# Patient Record
Sex: MALE | Race: WHITE | Employment: FULL TIME | URBAN - METROPOLITAN AREA
[De-identification: names, ages, dates, MRNs, and addresses within clinical notes are randomized per-mention and may not be internally consistent; named-entity substitution may affect disease eponyms.]

---

## 2017-01-19 ENCOUNTER — MYC REFILL (OUTPATIENT)
Dept: FAMILY MEDICINE | Facility: CLINIC | Age: 22
End: 2017-01-19

## 2017-01-19 DIAGNOSIS — F42.9 OCD (OBSESSIVE COMPULSIVE DISORDER): ICD-10-CM

## 2017-01-19 DIAGNOSIS — F33.0 MAJOR DEPRESSIVE DISORDER, RECURRENT EPISODE, MILD (H): ICD-10-CM

## 2017-01-20 NOTE — TELEPHONE ENCOUNTER
Message from [employer]:  Original authorizing provider: FABIENNE VENTURA MD    Rickey Villalta would like a refill of the following medications:  citalopram (CELEXA) 20 MG tablet [FABIENNE VENTURA MD]  buPROPion (WELLBUTRIN XL) 300 MG 24 hr tablet [FABIENNE VENTURA MD]    Preferred pharmacy: [place] [address] [address] [facility], [address]    Comment:

## 2017-01-25 RX ORDER — CITALOPRAM HYDROBROMIDE 20 MG/1
20 TABLET ORAL DAILY
Qty: 90 TABLET | Refills: 1 | Status: SHIPPED | OUTPATIENT
Start: 2017-01-25 | End: 2017-05-10

## 2017-01-25 RX ORDER — BUPROPION HYDROCHLORIDE 300 MG/1
300 TABLET ORAL EVERY MORNING
Qty: 90 TABLET | Refills: 1 | Status: SHIPPED | OUTPATIENT
Start: 2017-01-25 | End: 2017-05-10

## 2017-01-25 ASSESSMENT — PATIENT HEALTH QUESTIONNAIRE - PHQ9: SUM OF ALL RESPONSES TO PHQ QUESTIONS 1-9: 0

## 2017-01-26 ASSESSMENT — PATIENT HEALTH QUESTIONNAIRE - PHQ9: SUM OF ALL RESPONSES TO PHQ QUESTIONS 1-9: 0

## 2017-02-28 ENCOUNTER — MYC REFILL (OUTPATIENT)
Dept: FAMILY MEDICINE | Facility: CLINIC | Age: 22
End: 2017-02-28

## 2017-02-28 DIAGNOSIS — F42.9 OCD (OBSESSIVE COMPULSIVE DISORDER): ICD-10-CM

## 2017-02-28 DIAGNOSIS — F33.0 MAJOR DEPRESSIVE DISORDER, RECURRENT EPISODE, MILD (H): ICD-10-CM

## 2017-02-28 RX ORDER — CITALOPRAM HYDROBROMIDE 20 MG/1
20 TABLET ORAL DAILY
Qty: 90 TABLET | Refills: 1 | Status: CANCELLED | OUTPATIENT
Start: 2017-02-28

## 2017-02-28 RX ORDER — BUPROPION HYDROCHLORIDE 300 MG/1
300 TABLET ORAL EVERY MORNING
Qty: 90 TABLET | Refills: 1 | Status: CANCELLED | OUTPATIENT
Start: 2017-02-28

## 2017-02-28 NOTE — TELEPHONE ENCOUNTER
Message from [employer]:  Original authorizing provider: FABIENNE VENTURA MD    Rickey Villalta would like a refill of the following medications:  citalopram (CELEXA) 20 MG tablet [FABIENNE VENTURA MD]  buPROPion (WELLBUTRIN XL) 300 MG 24 hr tablet [FABIENNE VENTURA MD]    Preferred pharmacy: [place] [address] [address] [facility], [address]    Comment:

## 2017-05-10 ENCOUNTER — OFFICE VISIT (OUTPATIENT)
Dept: FAMILY MEDICINE | Facility: CLINIC | Age: 22
End: 2017-05-10
Payer: COMMERCIAL

## 2017-05-10 VITALS
SYSTOLIC BLOOD PRESSURE: 119 MMHG | OXYGEN SATURATION: 100 % | DIASTOLIC BLOOD PRESSURE: 65 MMHG | BODY MASS INDEX: 21.33 KG/M2 | HEART RATE: 55 BPM | HEIGHT: 69 IN | WEIGHT: 144 LBS

## 2017-05-10 DIAGNOSIS — F33.0 MAJOR DEPRESSIVE DISORDER, RECURRENT EPISODE, MILD (H): ICD-10-CM

## 2017-05-10 DIAGNOSIS — Z00.00 ROUTINE GENERAL MEDICAL EXAMINATION AT A HEALTH CARE FACILITY: Primary | ICD-10-CM

## 2017-05-10 DIAGNOSIS — J30.2 SEASONAL ALLERGIC RHINITIS, UNSPECIFIED ALLERGIC RHINITIS TRIGGER: ICD-10-CM

## 2017-05-10 DIAGNOSIS — Z23 NEED FOR VACCINATION: ICD-10-CM

## 2017-05-10 DIAGNOSIS — Z01.89 DIAGNOSTIC SKIN AND SENSITIZATION TESTS: ICD-10-CM

## 2017-05-10 DIAGNOSIS — F42.9 OCD (OBSESSIVE COMPULSIVE DISORDER): ICD-10-CM

## 2017-05-10 DIAGNOSIS — Z82.49 FHX: CARDIOVASCULAR DISEASE: ICD-10-CM

## 2017-05-10 DIAGNOSIS — Z11.3 SCREEN FOR STD (SEXUALLY TRANSMITTED DISEASE): ICD-10-CM

## 2017-05-10 LAB
ANION GAP SERPL CALCULATED.3IONS-SCNC: 9 MMOL/L (ref 3–14)
BUN SERPL-MCNC: 24 MG/DL (ref 7–30)
CALCIUM SERPL-MCNC: 9.6 MG/DL (ref 8.5–10.1)
CHLORIDE SERPL-SCNC: 105 MMOL/L (ref 94–109)
CHOLEST SERPL-MCNC: 123 MG/DL
CO2 SERPL-SCNC: 28 MMOL/L (ref 20–32)
CREAT SERPL-MCNC: 1.28 MG/DL (ref 0.66–1.25)
GFR SERPL CREATININE-BSD FRML MDRD: 69 ML/MIN/1.7M2
GLUCOSE SERPL-MCNC: 87 MG/DL (ref 70–99)
HDLC SERPL-MCNC: 53 MG/DL
LDLC SERPL CALC-MCNC: 60 MG/DL
NONHDLC SERPL-MCNC: 70 MG/DL
POTASSIUM SERPL-SCNC: 4.5 MMOL/L (ref 3.4–5.3)
SODIUM SERPL-SCNC: 142 MMOL/L (ref 133–144)
TRIGL SERPL-MCNC: 48 MG/DL

## 2017-05-10 PROCEDURE — 99395 PREV VISIT EST AGE 18-39: CPT | Mod: 25 | Performed by: PHYSICIAN ASSISTANT

## 2017-05-10 PROCEDURE — 90734 MENACWYD/MENACWYCRM VACC IM: CPT | Performed by: PHYSICIAN ASSISTANT

## 2017-05-10 PROCEDURE — 80061 LIPID PANEL: CPT | Performed by: PHYSICIAN ASSISTANT

## 2017-05-10 PROCEDURE — 90651 9VHPV VACCINE 2/3 DOSE IM: CPT | Performed by: PHYSICIAN ASSISTANT

## 2017-05-10 PROCEDURE — 36415 COLL VENOUS BLD VENIPUNCTURE: CPT | Performed by: PHYSICIAN ASSISTANT

## 2017-05-10 PROCEDURE — 80048 BASIC METABOLIC PNL TOTAL CA: CPT | Performed by: PHYSICIAN ASSISTANT

## 2017-05-10 PROCEDURE — 90471 IMMUNIZATION ADMIN: CPT | Performed by: PHYSICIAN ASSISTANT

## 2017-05-10 PROCEDURE — 90472 IMMUNIZATION ADMIN EACH ADD: CPT | Performed by: PHYSICIAN ASSISTANT

## 2017-05-10 RX ORDER — BUPROPION HYDROCHLORIDE 300 MG/1
300 TABLET ORAL EVERY MORNING
Qty: 30 TABLET | Refills: 0 | Status: SHIPPED | OUTPATIENT
Start: 2017-05-10 | End: 2017-05-10

## 2017-05-10 RX ORDER — CITALOPRAM HYDROBROMIDE 20 MG/1
20 TABLET ORAL DAILY
Qty: 30 TABLET | Refills: 0 | Status: SHIPPED | OUTPATIENT
Start: 2017-05-10 | End: 2017-05-10

## 2017-05-10 RX ORDER — CETIRIZINE HYDROCHLORIDE 10 MG/1
10 TABLET ORAL EVERY EVENING
Qty: 30 TABLET | Refills: 11 | Status: SHIPPED | OUTPATIENT
Start: 2017-05-10 | End: 2019-03-15

## 2017-05-10 RX ORDER — BUPROPION HYDROCHLORIDE 300 MG/1
300 TABLET ORAL EVERY MORNING
Qty: 90 TABLET | Refills: 1 | Status: SHIPPED | OUTPATIENT
Start: 2017-05-10 | End: 2018-01-09

## 2017-05-10 RX ORDER — CITALOPRAM HYDROBROMIDE 20 MG/1
20 TABLET ORAL DAILY
Qty: 90 TABLET | Refills: 1 | Status: SHIPPED | OUTPATIENT
Start: 2017-05-10 | End: 2018-01-09

## 2017-05-10 ASSESSMENT — ANXIETY QUESTIONNAIRES
2. NOT BEING ABLE TO STOP OR CONTROL WORRYING: NOT AT ALL
6. BECOMING EASILY ANNOYED OR IRRITABLE: NOT AT ALL
1. FEELING NERVOUS, ANXIOUS, OR ON EDGE: NOT AT ALL
GAD7 TOTAL SCORE: 0
3. WORRYING TOO MUCH ABOUT DIFFERENT THINGS: NOT AT ALL
IF YOU CHECKED OFF ANY PROBLEMS ON THIS QUESTIONNAIRE, HOW DIFFICULT HAVE THESE PROBLEMS MADE IT FOR YOU TO DO YOUR WORK, TAKE CARE OF THINGS AT HOME, OR GET ALONG WITH OTHER PEOPLE: NOT DIFFICULT AT ALL
7. FEELING AFRAID AS IF SOMETHING AWFUL MIGHT HAPPEN: NOT AT ALL
5. BEING SO RESTLESS THAT IT IS HARD TO SIT STILL: NOT AT ALL

## 2017-05-10 ASSESSMENT — PATIENT HEALTH QUESTIONNAIRE - PHQ9: 5. POOR APPETITE OR OVEREATING: NOT AT ALL

## 2017-05-10 NOTE — MR AVS SNAPSHOT
After Visit Summary   5/10/2017    Rickey Villalta    MRN: [id number]           Patient Information     Date Of Birth          1995        Visit Information        Provider Department      5/10/2017 7:40 AM Mik Eng PA-C [facility]        Today's Diagnoses     Routine general medical examination at a health care facility    -  1    OCD (obsessive compulsive disorder)        Major depressive disorder, recurrent episode, mild (H)        Seasonal allergic rhinitis, unspecified allergic rhinitis trigger        Screen for STD (sexually transmitted disease)          Care Instructions      Preventive Health Recommendations  Male Ages 18 - 25     Yearly exam:             See your health care provider every year in order to  o   Review health changes.   o   Discuss preventive care.    o   Review your medicines if your doctor has prescribed any.    You should be tested each year for STDs (sexually transmitted diseases).     Talk to your provider about cholesterol testing.      If you are at risk for diabetes, you should have a diabetes test (fasting glucose).    Shots: Get a flu shot each year. Get a tetanus shot every 10 years.     Nutrition:    Eat at least 5 servings of fruits and vegetables daily.     Eat whole-grain bread, whole-wheat pasta and brown rice instead of white grains and rice.     Talk to your provider about calcium and Vitamin D.     Lifestyle    Exercise for at least 150 minutes a week (30 minutes a day, 5 days a week). This will help you control your weight and prevent disease.     Limit alcohol to one drink per day.     No smoking.     Wear sunscreen to prevent skin cancer.     See your dentist every six months for an exam and cleaning.           Follow-ups after your visit        Who to contact     If you have questions or need follow up information about today's clinic visit or your schedule please contact [facility] directly at  "[phone].  Normal or non-critical lab and imaging results will be communicated to you by ScheduleThinghart, letter or phone within 4 business days after the clinic has received the results. If you do not hear from us within 7 days, please contact the clinic through G-CONt or phone. If you have a critical or abnormal lab result, we will notify you by phone as soon as possible.  Submit refill requests through [employer] or call your pharmacy and they will forward the refill request to us. Please allow 3 business days for your refill to be completed.          Additional Information About Your Visit        ScheduleThinghart Information     [employer] gives you secure access to your electronic health record. If you see a primary care provider, you can also send messages to your care team and make appointments. If you have questions, please call your primary care clinic.  If you do not have a primary care provider, please call [phone] and they will assist you.        Care EveryWhere ID     This is your Care EveryWhere ID. This could be used by other organizations to access your [facility] [place] medical records  [id number]        Your Vitals Were     Pulse Height Pulse Oximetry BMI (Body Mass Index)          55 5' 8.5\" (1.74 m) 100% 21.58 kg/m2         Blood Pressure from Last 3 Encounters:   05/10/17 119/65   07/07/16 113/65   06/22/16 117/66    Weight from Last 3 Encounters:   05/10/17 144 lb (65.3 kg)   07/07/16 144 lb (65.3 kg)   06/22/16 143 lb (64.9 kg)              We Performed the Following     Basic metabolic panel     Chlamydia trachomatis PCR     DEPRESSION ACTION PLAN (DAP)     Lipid panel reflex to direct LDL          Today's Medication Changes          These changes are accurate as of: 5/10/17  8:08 AM.  If you have any questions, ask your nurse or doctor.               Start taking these medicines.        Dose/Directions    buPROPion 300 MG 24 hr tablet   Commonly known as:  WELLBUTRIN XL   Used for:  OCD (obsessive compulsive " disorder), Major depressive disorder, recurrent episode, mild (H)   Started by:  Mik Eng PA-C        Dose:  300 mg   Take 1 tablet (300 mg) by mouth every morning   Quantity:  90 tablet   Refills:  1       cetirizine 10 MG tablet   Commonly known as:  zyrTEC   Used for:  Seasonal allergic rhinitis, unspecified allergic rhinitis trigger   Started by:  Mik Eng PA-C        Dose:  10 mg   Take 1 tablet (10 mg) by mouth every evening   Quantity:  30 tablet   Refills:  11       citalopram 20 MG tablet   Commonly known as:  celeXA   Used for:  OCD (obsessive compulsive disorder), Major depressive disorder, recurrent episode, mild (H)   Started by:  Mik Eng PA-C        Dose:  20 mg   Take 1 tablet (20 mg) by mouth daily   Quantity:  90 tablet   Refills:  1            Where to get your medicines      These medications were sent to [address], [facility] [address]  [address] [place] [address], [facility] [address]     Phone:  [phone]     cetirizine 10 MG tablet         Some of these will need a paper prescription and others can be bought over the counter.  Ask your nurse if you have questions.     Bring a paper prescription for each of these medications     buPROPion 300 MG 24 hr tablet    citalopram 20 MG tablet                Primary Care Provider Office Phone # Fax #    Mik Eng PA-C [phone]       [address] [address]        Thank you!     Thank you for choosing [facility]  for your care. Our goal is always to provide you with excellent care. Hearing back from our patients is one way we can continue to improve our services. Please take a few minutes to complete the written survey that you may receive in the mail after your visit with us. Thank you!             Your Updated Medication List - Protect others around you: Learn how to safely use, store and throw  away your medicines at www.disposemymeds.org.          This list is accurate as of: 5/10/17  8:08 AM.  Always use your most recent med list.                   Brand Name Dispense Instructions for use    buPROPion 300 MG 24 hr tablet    WELLBUTRIN XL    90 tablet    Take 1 tablet (300 mg) by mouth every morning       cetirizine 10 MG tablet    zyrTEC    30 tablet    Take 1 tablet (10 mg) by mouth every evening       citalopram 20 MG tablet    celeXA    90 tablet    Take 1 tablet (20 mg) by mouth daily

## 2017-05-10 NOTE — NURSING NOTE
Screening Questionnaire for Adult Immunization    Are you sick today?   No   Do you have allergies to medications, food, a vaccine component or latex?   No   Have you ever had a serious reaction after receiving a vaccination?   No   Do you have a long-term health problem with heart disease, lung disease, asthma, kidney disease, metabolic disease (e.g. diabetes), anemia, or other blood disorder?   No   Do you have cancer, leukemia, HIV/AIDS, or any other immune system problem?   No   In the past 3 months, have you taken medications that affect  your immune system, such as prednisone, other steroids, or anticancer drugs; drugs for the treatment of rheumatoid arthritis, Crohn s disease, or psoriasis; or have you had radiation treatments?   No   Have you had a seizure, or a brain or other nervous system problem?   No   During the past year, have you received a transfusion of blood or blood     products, or been given immune (gamma) globulin or antiviral drug?   No   For women: Are you pregnant or is there a chance you could become        pregnant during the next month?   No   Have you received any vaccinations in the past 4 weeks?   No     Immunization questionnaire answers were all negative.      MNVFC doesn't apply on this patient    Per orders of ADO, injection of HPV, Menectra given by Seema Skelton. Patient instructed to remain in clinic for 20 minutes afterwards, and to report any adverse reaction to me immediately.       Screening performed by Seema Skelton on 5/10/2017 at 8:15 AM.

## 2017-05-10 NOTE — NURSING NOTE
"Chief Complaint   Patient presents with     Physical       Initial /65  Pulse 55  Ht 5' 8.5\" (1.74 m)  Wt 144 lb (65.3 kg)  SpO2 100%  BMI 21.58 kg/m2 Estimated body mass index is 21.58 kg/(m^2) as calculated from the following:    Height as of this encounter: 5' 8.5\" (1.74 m).    Weight as of this encounter: 144 lb (65.3 kg).  Medication Reconciliation: complete  Seema Godfrey CMA    "

## 2017-05-10 NOTE — PROGRESS NOTES
SUBJECTIVE:     CC: Rickey Villalta is an 21 year old male who presents for preventative health visit.     Physical   Annual:     Getting at least 3 servings of Calcium per day::  Yes    Bi-annual eye exam::  Yes    Dental care twice a year::  Yes    Sleep apnea or symptoms of sleep apnea::  None    Diet::  Regular (no restrictions)    Frequency of exercise::  2-3 days/week    Duration of exercise::  45-60 minutes    Taking medications regularly::  Yes    Medication side effects::  None    Additional concerns today::  No      No concerns. Needs refills on meds  Depression and OCD stable on meds.       Today's PHQ-2 Score:   PHQ-2 ( 1999 Pfizer) 5/7/2017   Little interest or pleasure in doing things Not at all   Feeling down, depressed or hopeless Not at all   PHQ-2 Score 0       Abuse: Current or Past(Physical, Sexual or Emotional)- No  Do you feel safe in your environment - Yes    Social History   Substance Use Topics     Smoking status: Never Smoker     Smokeless tobacco: Never Used     Alcohol use Yes      Comment: Occ     The patient does not drink >3 drinks per day nor >7 drinks per week.    Last PSA: No results found for: PSA    Recent Labs   Lab Test  05/05/14   1526   CHOL  132   HDL  40*   LDL  76   TRIG  81   CHOLHDLRATIO  3.3       Reviewed orders with patient. Reviewed health maintenance and updated orders accordingly - Yes    Reviewed and updated as needed this visit by clinical staff  Tobacco  Allergies  Meds  Problems  Med Hx  Surg Hx  Fam Hx  Soc Hx          Reviewed and updated as needed this visit by Provider  Tobacco  Allergies  Meds  Problems  Med Hx  Surg Hx  Fam Hx  Soc Hx           Past Medical History:   Diagnosis Date     Coronary artery disease 2011    Heart murmur noted     Diagnostic skin and sensitization tests 5/12/14 IgE tests pos. only for: tree pollens.      Mild major depression (H) 5/1/2012     OCD (obsessive compulsive disorder)      Oral allergy syndrome     raw  carrots, celery, apples, peach--NOT a true food allergy and NO Epipen needed.     Seasonal allergic conjunctivitis     5/12/14 IgE tests pos. only for: tree pollens.      Seasonal allergic rhinitis       Past Surgical History:   Procedure Laterality Date     C STOMACH SURGERY PROCEDURE UNLISTED       EYE SURGERY  2014    Lasik     HC OPEN TX RADIAL & ULNAR SHAFT FX FIX RADIUS & ULNA       HC PERC SKELETAL FIX UNSTABLE PHAL SHAFT FX W MANIP, EACH  10/19/2010    perc pinning Rt index prox phalanx fx     HERNIA REPAIR  1996       ROS:  C: NEGATIVE for fever, chills, change in weight  I: NEGATIVE for worrisome rashes, moles or lesions  E: NEGATIVE for vision changes or irritation  ENT: NEGATIVE for ear, mouth and throat problems  R: NEGATIVE for significant cough or SOB  CV: NEGATIVE for chest pain, palpitations or peripheral edema  GI: NEGATIVE for nausea, abdominal pain, heartburn, or change in bowel habits   male: negative for dysuria, hematuria, decreased urinary stream, erectile dysfunction, urethral discharge  M: NEGATIVE for significant arthralgias or myalgia  N: NEGATIVE for weakness, dizziness or paresthesias  P: NEGATIVE for changes in mood or affect    Problem list, Medication list, Allergies, and Medical/Social/Surgical histories reviewed in [facility] and updated as appropriate.  Labs reviewed in EPIC  BP Readings from Last 3 Encounters:   05/10/17 119/65   07/07/16 113/65   06/22/16 117/66    Wt Readings from Last 3 Encounters:   05/10/17 144 lb (65.3 kg)   07/07/16 144 lb (65.3 kg)   06/22/16 143 lb (64.9 kg)                  Patient Active Problem List   Diagnosis     OCD (obsessive compulsive disorder)     Heart murmur     Infected insect bite of right leg     Impacted cerumen     Seasonal allergic conjunctivitis     Seasonal allergic rhinitis     Diagnostic skin and sensitization tests     FHx: cardiovascular disease     Common wart     Major depressive disorder, recurrent episode, mild (H)     Past  "Surgical History:   Procedure Laterality Date     C STOMACH SURGERY PROCEDURE UNLISTED       EYE SURGERY      Lasik     HC OPEN TX RADIAL & ULNAR SHAFT FX FIX RADIUS & ULNA       HC PERC SKELETAL FIX UNSTABLE PHAL SHAFT FX W MANIP, EACH  10/19/2010    perc pinning Rt index prox phalanx fx     HERNIA REPAIR         Social History   Substance Use Topics     Smoking status: Never Smoker     Smokeless tobacco: Never Used     Alcohol use Yes      Comment: Occ     Family History   Problem Relation Age of Onset     DIABETES Paternal Grandfather      Coronary Artery Disease Father      Hypertension Father      Myocardial Infarction Father 47          Macular Degeneration No family hx of      Glaucoma No family hx of      Thyroid Disease No family hx of      CANCER No family hx of      CEREBROVASCULAR DISEASE No family hx of          Current Outpatient Prescriptions   Medication Sig Dispense Refill     cetirizine (ZYRTEC) 10 MG tablet Take 1 tablet (10 mg) by mouth every evening 30 tablet 11     citalopram (CELEXA) 20 MG tablet Take 1 tablet (20 mg) by mouth daily 90 tablet 1     buPROPion (WELLBUTRIN XL) 300 MG 24 hr tablet Take 1 tablet (300 mg) by mouth every morning 90 tablet 1     [DISCONTINUED] citalopram (CELEXA) 20 MG tablet Take 1 tablet (20 mg) by mouth daily 30 tablet 0     [DISCONTINUED] buPROPion (WELLBUTRIN XL) 300 MG 24 hr tablet Take 1 tablet (300 mg) by mouth every morning 30 tablet 0     [DISCONTINUED] citalopram (CELEXA) 20 MG tablet Take 1 tablet (20 mg) by mouth daily 90 tablet 1     [DISCONTINUED] buPROPion (WELLBUTRIN XL) 300 MG 24 hr tablet Take 1 tablet (300 mg) by mouth every morning 90 tablet 1     Allergies   Allergen Reactions     Nkda [No Known Drug Allergies]      OBJECTIVE:     /65  Pulse 55  Ht 5' 8.5\" (1.74 m)  Wt 144 lb (65.3 kg)  SpO2 100%  BMI 21.58 kg/m2  EXAM:  GENERAL: healthy, alert and no distress  EYES: Eyes grossly normal to inspection, PERRL and conjunctivae " and sclerae normal  HENT: ear canals and TM's normal, nose and mouth without ulcers or lesions  NECK: no adenopathy, no asymmetry, masses, or scars and thyroid normal to palpation  RESP: lungs clear to auscultation - no rales, rhonchi or wheezes  CV: regular rate and rhythm, normal S1 S2, no S3 or S4, no murmur, click or rub, no peripheral edema and peripheral pulses strong  ABDOMEN: soft, nontender, no hepatosplenomegaly, no masses and bowel sounds normal   (male): normal male genitalia without lesions or urethral discharge, no hernia  MS: no gross musculoskeletal defects noted, no edema  SKIN: no suspicious lesions or rashes  NEURO: Normal strength and tone, mentation intact and speech normal  PSYCH: mentation appears normal, affect normal/bright    ASSESSMENT/PLAN:         ICD-10-CM    1. Routine general medical examination at a health care facility Z00.00 Lipid panel reflex to direct LDL     Basic metabolic panel   2. OCD (obsessive compulsive disorder) F42.9 citalopram (CELEXA) 20 MG tablet     buPROPion (WELLBUTRIN XL) 300 MG 24 hr tablet     DISCONTINUED: citalopram (CELEXA) 20 MG tablet     DISCONTINUED: buPROPion (WELLBUTRIN XL) 300 MG 24 hr tablet   3. Major depressive disorder, recurrent episode, mild (H) F33.0 citalopram (CELEXA) 20 MG tablet     buPROPion (WELLBUTRIN XL) 300 MG 24 hr tablet     DISCONTINUED: citalopram (CELEXA) 20 MG tablet     DISCONTINUED: buPROPion (WELLBUTRIN XL) 300 MG 24 hr tablet   4. Seasonal allergic rhinitis, unspecified allergic rhinitis trigger J30.2 cetirizine (ZYRTEC) 10 MG tablet   5. Screen for STD (sexually transmitted disease) Z11.3 Chlamydia trachomatis PCR   6. Need for vaccination Z23 MENINGOCOCCAL VACCINE,IM (MENACTRA) [01942] AGE 11-55     HUMAN PAPILLOMA VIRUS (GARDASIL 9) VACCINE [74954]     1st  Administration  [57919]     Each additional admin.  (Right click and add QUANTITY)  [04463]   7. FHx: cardiovascular disease Z82.49    8. Diagnostic skin and  "sensitization tests Z01.89        COUNSELING:   Reviewed preventive health counseling, as reflected in patient instructions       Regular exercise       Healthy diet/nutrition         reports that he has never smoked. He has never used smokeless tobacco.    Estimated body mass index is 21.58 kg/(m^2) as calculated from the following:    Height as of this encounter: 5' 8.5\" (1.74 m).    Weight as of this encounter: 144 lb (65.3 kg).       Counseling Resources:  ATP IV Guidelines  Pooled Cohorts Equation Calculator  FRAX Risk Assessment  ICSI Preventive Guidelines  Dietary Guidelines for Americans, 2010  USDA's MyPlate  ASA Prophylaxis  Lung CA Screening    Mik Eng PA-C  [facility]  "

## 2017-05-10 NOTE — LETTER
My Depression Action Plan  Name: Rickey Villalta   Date of Birth 1995  Date: 5/10/2017    My doctor: Johnny Yan   My clinic: [facility]  [address] [place] [id number] [phone]          GREEN    ZONE   Good Control    What it looks like:     Things are going generally well. You have normal up s and down s. You may even feel depressed from time to time, but bad moods usually last less than a day.   What you need to do:  1. Continue to care for yourself (see self care plan)  2. Check your depression survival kit and update it as needed  3. Follow your physician s recommendations including any medication.  4. Do not stop taking medication unless you consult with your physician first.           YELLOW         ZONE Getting Worse    What it looks like:     Depression is starting to interfere with your life.     It may be hard to get out of bed; you may be starting to isolate yourself from others.    Symptoms of depression are starting to last most all day and this has happened for several days.     You may have suicidal thoughts but they are not constant.   What you need to do:     1. Call your care team, your response to treatment will improve if you keep your care team informed of your progress. Yellow periods are signs an adjustment may need to be made.     2. Continue your self-care, even if you have to fake it!    3. Talk to someone in your support network    4. Open up your depression survival kit           RED    ZONE Medical Alert - Get Help    What it looks like:     Depression is seriously interfering with your life.     You may experience these or other symptoms: You can t get out of bed most days, can t work or engage in other necessary activities, you have trouble taking care of basic hygiene, or basic responsibilities, thoughts of suicide or death that will not go away, self-injurious behavior.     What you need to do:  1. Call your care team and request a  same-day appointment. If they are not available (weekends or after hours) call your local crisis line, emergency room or 911.      Electronically signed by: Seema Skelton, May 10, 2017    Depression Self Care Plan / Survival Kit    Self-Care for Depression  Here s the deal. Your body and mind are really not as separate as most people think.  What you do and think affects how you feel and how you feel influences what you do and think. This means if you do things that people who feel good do, it will help you feel better.  Sometimes this is all it takes.  There is also a place for medication and therapy depending on how severe your depression is, so be sure to consult with your medical provider and/ or Behavioral Health Consultant if your symptoms are worsening or not improving.     In order to better manage my stress, I will:    Exercise  Get some form of exercise, every day. This will help reduce pain and release endorphins, the  feel good  chemicals in your brain. This is almost as good as taking antidepressants!  This is not the same as joining a gym and then never going! (they count on that by the way ) It can be as simple as just going for a walk or doing some gardening, anything that will get you moving.      Hygiene   Maintain good hygiene (Get out of bed in the morning, Make your bed, Brush your teeth, Take a shower, and Get dressed like you were going to work, even if you are unemployed).  If your clothes don't fit try to get ones that do.    Diet  I will strive to eat foods that are good for me, drink plenty of water, and avoid excessive sugar, caffeine, alcohol, and other mood-altering substances.  Some foods that are helpful in depression are: complex carbohydrates, B vitamins, flaxseed, fish or fish oil, fresh fruits and vegetables.    Psychotherapy  I agree to participate in Individual Therapy (if recommended).    Medication  If prescribed medications, I agree to take them.  Missing doses can result  in serious side effects.  I understand that drinking alcohol, or other illicit drug use, may cause potential side effects.  I will not stop my medication abruptly without first discussing it with my provider.    Staying Connected With Others  I will stay in touch with my friends, family members, and my primary care provider/team.    Use your imagination  Be creative.  We all have a creative side; it doesn t matter if it s oil painting, sand castles, or mud pies! This will also kick up the endorphins.    Witness Beauty  (AKA stop and smell the roses) Take a look outside, even in mid-winter. Notice colors, textures. Watch the squirrels and birds.     Service to others  Be of service to others.  There is always someone else in need.  By helping others we can  get out of ourselves  and remember the really important things.  This also provides opportunities for practicing all the other parts of the program.    Humor  Laugh and be silly!  Adjust your TV habits for less news and crime-drama and more comedy.    Control your stress  Try breathing deep, massage therapy, biofeedback, and meditation. Find time to relax each day.     My support system    Clinic Contact:  Phone number:    Contact 1:  Phone number:    Contact 2:  Phone number:    Adventism/:  Phone number:    Therapist:  Phone number:    Local crisis center:    Phone number:    Other community support:  Phone number:

## 2017-05-11 ASSESSMENT — PATIENT HEALTH QUESTIONNAIRE - PHQ9: SUM OF ALL RESPONSES TO PHQ QUESTIONS 1-9: 0

## 2017-05-11 ASSESSMENT — ANXIETY QUESTIONNAIRES: GAD7 TOTAL SCORE: 0

## 2017-05-14 LAB
C TRACH DNA SPEC QL NAA+PROBE: ABNORMAL
SPECIMEN SOURCE: ABNORMAL

## 2017-05-15 NOTE — PROGRESS NOTES
MrTalib Villalta,    All of your labs were normal for you.    Please contact the clinic if you have additional questions.  Thank you.    Sincerely,    Mik Eng PA-C

## 2018-01-09 DIAGNOSIS — F42.9 OCD (OBSESSIVE COMPULSIVE DISORDER): ICD-10-CM

## 2018-01-09 DIAGNOSIS — F33.0 MAJOR DEPRESSIVE DISORDER, RECURRENT EPISODE, MILD (H): ICD-10-CM

## 2018-01-09 NOTE — LETTER
January 12, 2018    Rickey Villalta  [address]   [place] [address]    Dear Rickey,       We recently received a refill request for citalopram (CELEXA) 20 MG tablet  .  We have refilled this for a one time 90 day supply only because you are due for a:    depression office visit      Please call at your earliest convenience so that there will not be a delay with your future refills.          Thank you,   Your [facility] Team/na  [phone]

## 2018-01-10 ENCOUNTER — MYC MEDICAL ADVICE (OUTPATIENT)
Dept: FAMILY MEDICINE | Facility: CLINIC | Age: 23
End: 2018-01-10

## 2018-01-12 RX ORDER — CITALOPRAM HYDROBROMIDE 20 MG/1
TABLET ORAL
Qty: 90 TABLET | Refills: 0 | Status: SHIPPED | OUTPATIENT
Start: 2018-01-12 | End: 2018-06-27

## 2018-01-12 RX ORDER — BUPROPION HYDROCHLORIDE 300 MG/1
TABLET ORAL
Qty: 90 TABLET | Refills: 0 | Status: SHIPPED | OUTPATIENT
Start: 2018-01-12 | End: 2018-06-27

## 2018-01-12 NOTE — TELEPHONE ENCOUNTER
Pt due for depression ov for further refills or can update PHQ over phone or through T[employer].   send letter.  Tiffany Paulino RN

## 2018-01-13 ASSESSMENT — PATIENT HEALTH QUESTIONNAIRE - PHQ9
SUM OF ALL RESPONSES TO PHQ QUESTIONS 1-9: 0
SUM OF ALL RESPONSES TO PHQ QUESTIONS 1-9: 0

## 2018-01-14 ASSESSMENT — PATIENT HEALTH QUESTIONNAIRE - PHQ9: SUM OF ALL RESPONSES TO PHQ QUESTIONS 1-9: 0

## 2018-04-24 ENCOUNTER — OFFICE VISIT (OUTPATIENT)
Dept: FAMILY MEDICINE | Facility: CLINIC | Age: 23
End: 2018-04-24
Payer: COMMERCIAL

## 2018-04-24 VITALS
TEMPERATURE: 98.1 F | DIASTOLIC BLOOD PRESSURE: 68 MMHG | SYSTOLIC BLOOD PRESSURE: 136 MMHG | HEART RATE: 78 BPM | OXYGEN SATURATION: 98 % | BODY MASS INDEX: 22.73 KG/M2 | HEIGHT: 68 IN | RESPIRATION RATE: 15 BRPM | WEIGHT: 150 LBS

## 2018-04-24 DIAGNOSIS — Z11.3 SCREEN FOR STD (SEXUALLY TRANSMITTED DISEASE): ICD-10-CM

## 2018-04-24 DIAGNOSIS — L72.9 SCROTAL CYST: ICD-10-CM

## 2018-04-24 DIAGNOSIS — B36.0 TINEA VERSICOLOR: ICD-10-CM

## 2018-04-24 DIAGNOSIS — Z00.00 ROUTINE GENERAL MEDICAL EXAMINATION AT A HEALTH CARE FACILITY: Primary | ICD-10-CM

## 2018-04-24 PROCEDURE — 87491 CHLMYD TRACH DNA AMP PROBE: CPT | Performed by: PHYSICIAN ASSISTANT

## 2018-04-24 PROCEDURE — 99395 PREV VISIT EST AGE 18-39: CPT | Performed by: PHYSICIAN ASSISTANT

## 2018-04-24 RX ORDER — KETOCONAZOLE 200 MG/1
200 TABLET ORAL DAILY
Qty: 2 TABLET | Refills: 1 | Status: SHIPPED | OUTPATIENT
Start: 2018-04-24 | End: 2018-04-24

## 2018-04-24 RX ORDER — KETOCONAZOLE 200 MG/1
400 TABLET ORAL DAILY
Qty: 2 TABLET | Refills: 1 | Status: SHIPPED | OUTPATIENT
Start: 2018-04-24 | End: 2019-03-15

## 2018-04-24 ASSESSMENT — PATIENT HEALTH QUESTIONNAIRE - PHQ9: 5. POOR APPETITE OR OVEREATING: NEARLY EVERY DAY

## 2018-04-24 ASSESSMENT — ANXIETY QUESTIONNAIRES
7. FEELING AFRAID AS IF SOMETHING AWFUL MIGHT HAPPEN: SEVERAL DAYS
6. BECOMING EASILY ANNOYED OR IRRITABLE: MORE THAN HALF THE DAYS
GAD7 TOTAL SCORE: 15
1. FEELING NERVOUS, ANXIOUS, OR ON EDGE: MORE THAN HALF THE DAYS
5. BEING SO RESTLESS THAT IT IS HARD TO SIT STILL: NEARLY EVERY DAY
2. NOT BEING ABLE TO STOP OR CONTROL WORRYING: MORE THAN HALF THE DAYS
3. WORRYING TOO MUCH ABOUT DIFFERENT THINGS: MORE THAN HALF THE DAYS
IF YOU CHECKED OFF ANY PROBLEMS ON THIS QUESTIONNAIRE, HOW DIFFICULT HAVE THESE PROBLEMS MADE IT FOR YOU TO DO YOUR WORK, TAKE CARE OF THINGS AT HOME, OR GET ALONG WITH OTHER PEOPLE: SOMEWHAT DIFFICULT

## 2018-04-24 ASSESSMENT — PAIN SCALES - GENERAL: PAINLEVEL: NO PAIN (0)

## 2018-04-24 NOTE — PROGRESS NOTES
SUBJECTIVE:   CC: Rickey Villalta is an 22 year old male who presents for preventative health visit.     Physical   Annual:     Getting at least 3 servings of Calcium per day::  Yes    Bi-annual eye exam::  Yes    Dental care twice a year::  Yes    Sleep apnea or symptoms of sleep apnea::  None    Diet::  Regular (no restrictions)    Frequency of exercise::  2-3 days/week    Duration of exercise::  30-45 minutes    Taking medications regularly::  Yes    Medication side effects::  None              Scrotum lump for years. No changes.      Today's PHQ-2 Score:   PHQ-2 ( 1999 Pfizer) 4/23/2018   Q1: Little interest or pleasure in doing things 0   Q2: Feeling down, depressed or hopeless 0   PHQ-2 Score 0   Q1: Little interest or pleasure in doing things Not at all   Q2: Feeling down, depressed or hopeless Not at all   PHQ-2 Score 0       Abuse: Current or Past(Physical, Sexual or Emotional)- No  Do you feel safe in your environment - Yes    Social History   Substance Use Topics     Smoking status: Never Smoker     Smokeless tobacco: Never Used     Alcohol use Yes      Comment: Occ     Alcohol Use 4/23/2018   If you drink alcohol do you typically have greater than 3 drinks per day OR greater than 7 drinks per week? No     Last PSA: No results found for: PSA    Reviewed orders with patient. Reviewed health maintenance and updated orders accordingly - Yes  Labs reviewed in EPIC  BP Readings from Last 3 Encounters:   04/24/18 136/68   05/10/17 119/65   07/07/16 113/65    Wt Readings from Last 3 Encounters:   04/24/18 150 lb (68 kg)   05/10/17 144 lb (65.3 kg)   07/07/16 144 lb (65.3 kg)                  Patient Active Problem List   Diagnosis     OCD (obsessive compulsive disorder)     Heart murmur     Infected insect bite of right leg     Impacted cerumen     Seasonal allergic conjunctivitis     Seasonal allergic rhinitis     Diagnostic skin and sensitization tests     FHx: cardiovascular disease     Common wart     Major  depressive disorder, recurrent episode, mild (H)     Tinea versicolor     Scrotal cyst     Past Surgical History:   Procedure Laterality Date     C STOMACH SURGERY PROCEDURE UNLISTED       EYE SURGERY      Lasik     HC OPEN TX RADIAL & ULNAR SHAFT FX FIX RADIUS & ULNA       HC PERC SKELETAL FIX UNSTABLE PHAL SHAFT FX W MANIP, EACH  10/19/2010    perc pinning Rt index prox phalanx fx     HERNIA REPAIR         Social History   Substance Use Topics     Smoking status: Never Smoker     Smokeless tobacco: Never Used     Alcohol use Yes      Comment: Occ     Family History   Problem Relation Age of Onset     DIABETES Paternal Grandfather      Coronary Artery Disease Father      Hypertension Father      Myocardial Infarction Father 47          Macular Degeneration No family hx of      Glaucoma No family hx of      Thyroid Disease No family hx of      CANCER No family hx of      CEREBROVASCULAR DISEASE No family hx of          Current Outpatient Prescriptions   Medication Sig Dispense Refill     buPROPion (WELLBUTRIN XL) 300 MG 24 hr tablet TAKE 1 TABLET EVERY MORNING 90 tablet 0     cetirizine (ZYRTEC) 10 MG tablet Take 1 tablet (10 mg) by mouth every evening 30 tablet 11     citalopram (CELEXA) 20 MG tablet TAKE 1 TABLET DAILY 90 tablet 0     ketoconazole (NIZORAL) 200 MG tablet Take 2 tablets (400 mg) by mouth daily 2 tablet 1     Allergies   Allergen Reactions     Nkda [No Known Drug Allergies]        Reviewed and updated as needed this visit by clinical staff  Tobacco  Allergies  Meds  Med Hx  Surg Hx  Fam Hx  Soc Hx        Reviewed and updated as needed this visit by Provider          Past Medical History:   Diagnosis Date     Coronary artery disease     Heart murmur noted     Diagnostic skin and sensitization tests 14 IgE tests pos. only for: tree pollens.      Mild major depression (H) 2012     OCD (obsessive compulsive disorder)      Oral allergy syndrome     raw carrots, celery,  "apples, peach--NOT a true food allergy and NO Epipen needed.     Seasonal allergic conjunctivitis     5/12/14 IgE tests pos. only for: tree pollens.      Seasonal allergic rhinitis       Past Surgical History:   Procedure Laterality Date     C STOMACH SURGERY PROCEDURE UNLISTED       EYE SURGERY  2014    Lasik     HC OPEN TX RADIAL & ULNAR SHAFT FX FIX RADIUS & ULNA       HC PERC SKELETAL FIX UNSTABLE PHAL SHAFT FX W MANIP, EACH  10/19/2010    perc pinning Rt index prox phalanx fx     HERNIA REPAIR  1996       Review of Systems  C: NEGATIVE for fever, chills, change in weight  I: NEGATIVE for worrisome rashes, moles or lesions  E: NEGATIVE for vision changes or irritation  ENT: NEGATIVE for ear, mouth and throat problems  R: NEGATIVE for significant cough or SOB  CV: NEGATIVE for chest pain, palpitations or peripheral edema  GI: NEGATIVE for nausea, abdominal pain, heartburn, or change in bowel habits   male: negative for dysuria, hematuria, decreased urinary stream, erectile dysfunction, urethral discharge  M: NEGATIVE for significant arthralgias or myalgia  N: NEGATIVE for weakness, dizziness or paresthesias  P: NEGATIVE for changes in mood or affect    OBJECTIVE:   /68  Pulse 78  Temp 98.1  F (36.7  C) (Oral)  Resp 15  Ht 5' 8.11\" (1.73 m)  Wt 150 lb (68 kg)  SpO2 98%  BMI 22.73 kg/m2    Physical Exam  GENERAL: healthy, alert and no distress  EYES: Eyes grossly normal to inspection, PERRL and conjunctivae and sclerae normal  HENT: ear canals and TM's normal, nose and mouth without ulcers or lesions  NECK: no adenopathy, no asymmetry, masses, or scars and thyroid normal to palpation  RESP: lungs clear to auscultation - no rales, rhonchi or wheezes  CV: regular rate and rhythm, normal S1 S2, no S3 or S4, no murmur, click or rub, no peripheral edema and peripheral pulses strong  ABDOMEN: soft, nontender, no hepatosplenomegaly, no masses and bowel sounds normal   (male): normal male genitalia without " "lesions or urethral discharge, no hernia  2 small scrotal cyst. Non-tender.   MS: no gross musculoskeletal defects noted, no edema  SKIN: faint hyperpigmentation on chest.   Right upper eye lid with dry patch and small similar lesion on right cheek.   NEURO: Normal strength and tone, mentation intact and speech normal  PSYCH: mentation appears normal, affect normal/bright      ASSESSMENT/PLAN:       ICD-10-CM    1. Routine general medical examination at a health care facility Z00.00    2. Tinea versicolor B36.0 ketoconazole (NIZORAL) 200 MG tablet     DISCONTINUED: ketoconazole (NIZORAL) 200 MG tablet   3. Scrotal cyst L72.9    4. Screen for STD (sexually transmitted disease) Z11.3 Chlamydia trachomatis PCR     1. Work on Healthy diet and exercise. Getting heart rate elevated for 30 mins most days of week.  2. Ok for ketoconazole.   3. Patient reassurance. warning signs discussed.  Recheck 6 months.   COUNSELING:   Reviewed preventive health counseling, as reflected in patient instructions       Regular exercise       Healthy diet/nutrition         reports that he has never smoked. He has never used smokeless tobacco.    Estimated body mass index is 22.73 kg/(m^2) as calculated from the following:    Height as of this encounter: 5' 8.11\" (1.73 m).    Weight as of this encounter: 150 lb (68 kg).       Counseling Resources:  ATP IV Guidelines  Pooled Cohorts Equation Calculator  FRAX Risk Assessment  ICSI Preventive Guidelines  Dietary Guidelines for Americans, 2010  USDA's MyPlate  ASA Prophylaxis  Lung CA Screening    Mik Eng PA-C  [facility]  "

## 2018-04-24 NOTE — MR AVS SNAPSHOT
After Visit Summary   4/24/2018    Rickey Villalta    MRN: [id number]           Patient Information     Date Of Birth          1995        Visit Information        Provider Department      4/24/2018 10:20 AM Mik Eng PA-C [facility]        Today's Diagnoses     Routine general medical examination at a health care facility    -  1    Tinea versicolor        Scrotal cyst        Screen for STD (sexually transmitted disease)          Care Instructions      Preventive Health Recommendations  Male Ages 18 - 25     Yearly exam:             See your health care provider every year in order to  o   Review health changes.   o   Discuss preventive care.    o   Review your medicines if your doctor has prescribed any.    You should be tested each year for STDs (sexually transmitted diseases).     Talk to your provider about cholesterol testing.      If you are at risk for diabetes, you should have a diabetes test (fasting glucose).    Shots: Get a flu shot each year. Get a tetanus shot every 10 years.     Nutrition:    Eat at least 5 servings of fruits and vegetables daily.     Eat whole-grain bread, whole-wheat pasta and brown rice instead of white grains and rice.     Talk to your provider about calcium and Vitamin D.     Lifestyle    Exercise for at least 150 minutes a week (30 minutes a day, 5 days a week). This will help you control your weight and prevent disease.     Limit alcohol to one drink per day.     No smoking.     Wear sunscreen to prevent skin cancer.     See your dentist every six months for an exam and cleaning.             Follow-ups after your visit        Who to contact     If you have questions or need follow up information about today's clinic visit or your schedule please contact [facility] [place] directly at [phone].  Normal or non-critical lab and imaging results will be communicated to you by MyChart, letter or phone within 4 business days after  "the clinic has received the results. If you do not hear from us within 7 days, please contact the clinic through iLost or phone. If you have a critical or abnormal lab result, we will notify you by phone as soon as possible.  Submit refill requests through iLost or call your pharmacy and they will forward the refill request to us. Please allow 3 business days for your refill to be completed.          Additional Information About Your Visit        [employer] Information     iLost gives you secure access to your electronic health record. If you see a primary care provider, you can also send messages to your care team and make appointments. If you have questions, please call your primary care clinic.  If you do not have a primary care provider, please call [phone] and they will assist you.        Care EveryWhere ID     This is your Care EveryWhere ID. This could be used by other organizations to access your [facility] medical records  [id number]        Your Vitals Were     Pulse Temperature Respirations Height Pulse Oximetry BMI (Body Mass Index)    78 98.1  F (36.7  C) (Oral) 15 5' 8.11\" (1.73 m) 98% 22.73 kg/m2       Blood Pressure from Last 3 Encounters:   04/24/18 136/68   05/10/17 119/65   07/07/16 113/65    Weight from Last 3 Encounters:   04/24/18 150 lb (68 kg)   05/10/17 144 lb (65.3 kg)   07/07/16 144 lb (65.3 kg)              We Performed the Following     Chlamydia trachomatis PCR          Today's Medication Changes          These changes are accurate as of 4/24/18 10:30 AM.  If you have any questions, ask your nurse or doctor.               Start taking these medicines.        Dose/Directions    ketoconazole 200 MG tablet   Commonly known as:  NIZORAL   Used for:  Tinea versicolor   Started by:  Mik Eng PA-C        Dose:  200 mg   Take 1 tablet (200 mg) by mouth daily   Quantity:  2 tablet   Refills:  1            Where to get your medicines      These medications were sent to " [place], [place] - [address] [address]  [address] [facility], [facility] [address], [facility] [address]     Phone:  [phone]     ketoconazole 200 MG tablet                Primary Care Provider Office Phone # Fax #    Mik Bin Eng PA-C [phone] [address] [facility] [address]        Equal Access to Services     SOLITARIO RICE : Hadii aad ku hadasho Soomaali, waaxda luqadaha, qaybta kaalmada adeegyada, waxay idiin hayaan adeeg kharash labritt . So [facility] [phone].    ATENCIÓN: Si jose juanla kelvin, tiene a peace disposición servicios gratuitos de asistencia lingüística. Alexander al [phone].    We comply with applicable federal civil rights laws and [place] laws. We do not discriminate on the basis of race, color, national origin, age, disability, sex, sexual orientation, or gender identity.            Thank you!     Thank you for choosing [facility]  for your care. Our goal is always to provide you with excellent care. Hearing back from our patients is one way we can continue to improve our services. Please take a few minutes to complete the written survey that you may receive in the mail after your visit with us. Thank you!             Your Updated Medication List - Protect others around you: Learn how to safely use, store and throw away your medicines at www.disposemymeds.org.          This list is accurate as of 4/24/18 10:30 AM.  Always use your most recent med list.                   Brand Name Dispense Instructions for use Diagnosis    buPROPion 300 MG 24 hr tablet    WELLBUTRIN XL    90 tablet    TAKE 1 TABLET EVERY MORNING    OCD (obsessive compulsive disorder), Major depressive disorder, recurrent episode, mild (H)       cetirizine 10 MG tablet    zyrTEC    30 tablet    Take 1 tablet (10 mg) by mouth every evening    Seasonal allergic rhinitis, unspecified allergic rhinitis trigger       citalopram 20 MG tablet    celeXA    90 tablet    TAKE 1  TABLET DAILY    OCD (obsessive compulsive disorder), Major depressive disorder, recurrent episode, mild (H)       ketoconazole 200 MG tablet    NIZORAL    2 tablet    Take 1 tablet (200 mg) by mouth daily    Tinea versicolor

## 2018-04-24 NOTE — LETTER
My Depression Action Plan  Name: Rickey Villalta   Date of Birth 1995  Date: 4/24/2018    My doctor: Mik Eng   My clinic: [facility]  [address] [facility] [address] [phone]          GREEN    ZONE   Good Control    What it looks like:     Things are going generally well. You have normal up s and down s. You may even feel depressed from time to time, but bad moods usually last less than a day.   What you need to do:  1. Continue to care for yourself (see self care plan)  2. Check your depression survival kit and update it as needed  3. Follow your physician s recommendations including any medication.  4. Do not stop taking medication unless you consult with your physician first.           YELLOW         ZONE Getting Worse    What it looks like:     Depression is starting to interfere with your life.     It may be hard to get out of bed; you may be starting to isolate yourself from others.    Symptoms of depression are starting to last most all day and this has happened for several days.     You may have suicidal thoughts but they are not constant.   What you need to do:     1. Call your care team, your response to treatment will improve if you keep your care team informed of your progress. Yellow periods are signs an adjustment may need to be made.     2. Continue your self-care, even if you have to fake it!    3. Talk to someone in your support network    4. Open up your depression survival kit           RED    ZONE Medical Alert - Get Help    What it looks like:     Depression is seriously interfering with your life.     You may experience these or other symptoms: You can t get out of bed most days, can t work or engage in other necessary activities, you have trouble taking care of basic hygiene, or basic responsibilities, thoughts of suicide or death that will not go away, self-injurious behavior.     What you need to do:  1. Call your care team and  request a same-day appointment. If they are not available (weekends or after hours) call your local crisis line, emergency room or 911.            Depression Self Care Plan / Survival Kit    Self-Care for Depression  Here s the deal. Your body and mind are really not as separate as most people think.  What you do and think affects how you feel and how you feel influences what you do and think. This means if you do things that people who feel good do, it will help you feel better.  Sometimes this is all it takes.  There is also a place for medication and therapy depending on how severe your depression is, so be sure to consult with your medical provider and/ or Behavioral Health Consultant if your symptoms are worsening or not improving.     In order to better manage my stress, I will:    Exercise  Get some form of exercise, every day. This will help reduce pain and release endorphins, the  feel good  chemicals in your brain. This is almost as good as taking antidepressants!  This is not the same as joining a gym and then never going! (they count on that by the way ) It can be as simple as just going for a walk or doing some gardening, anything that will get you moving.      Hygiene   Maintain good hygiene (Get out of bed in the morning, Make your bed, Brush your teeth, Take a shower, and Get dressed like you were going to work, even if you are unemployed).  If your clothes don't fit try to get ones that do.    Diet  I will strive to eat foods that are good for me, drink plenty of water, and avoid excessive sugar, caffeine, alcohol, and other mood-altering substances.  Some foods that are helpful in depression are: complex carbohydrates, B vitamins, flaxseed, fish or fish oil, fresh fruits and vegetables.    Psychotherapy  I agree to participate in Individual Therapy (if recommended).    Medication  If prescribed medications, I agree to take them.  Missing doses can result in serious side effects.  I understand that  drinking alcohol, or other illicit drug use, may cause potential side effects.  I will not stop my medication abruptly without first discussing it with my provider.    Staying Connected With Others  I will stay in touch with my friends, family members, and my primary care provider/team.    Use your imagination  Be creative.  We all have a creative side; it doesn t matter if it s oil painting, sand castles, or mud pies! This will also kick up the endorphins.    Witness Beauty  (AKA stop and smell the roses) Take a look outside, even in mid-winter. Notice colors, textures. Watch the squirrels and birds.     Service to others  Be of service to others.  There is always someone else in need.  By helping others we can  get out of ourselves  and remember the really important things.  This also provides opportunities for practicing all the other parts of the program.    Humor  Laugh and be silly!  Adjust your TV habits for less news and crime-drama and more comedy.    Control your stress  Try breathing deep, massage therapy, biofeedback, and meditation. Find time to relax each day.     My support system    Clinic Contact:  Phone number:    Contact 1:  Phone number:    Contact 2:  Phone number:    [facility]/:  Phone number:    Therapist:  Phone number:    Local crisis center:    Phone number:    Other community support:  Phone number:

## 2018-04-25 ASSESSMENT — ANXIETY QUESTIONNAIRES: GAD7 TOTAL SCORE: 15

## 2018-04-25 ASSESSMENT — PATIENT HEALTH QUESTIONNAIRE - PHQ9: SUM OF ALL RESPONSES TO PHQ QUESTIONS 1-9: 15

## 2018-04-27 LAB
C TRACH DNA SPEC QL NAA+PROBE: NEGATIVE
SPECIMEN SOURCE: NORMAL

## 2018-06-27 ENCOUNTER — MYC MEDICAL ADVICE (OUTPATIENT)
Dept: FAMILY MEDICINE | Facility: CLINIC | Age: 23
End: 2018-06-27

## 2018-06-27 ENCOUNTER — MYC REFILL (OUTPATIENT)
Dept: FAMILY MEDICINE | Facility: CLINIC | Age: 23
End: 2018-06-27

## 2018-06-27 DIAGNOSIS — F42.9 OCD (OBSESSIVE COMPULSIVE DISORDER): ICD-10-CM

## 2018-06-27 DIAGNOSIS — F33.0 MAJOR DEPRESSIVE DISORDER, RECURRENT EPISODE, MILD (H): ICD-10-CM

## 2018-06-27 RX ORDER — CITALOPRAM HYDROBROMIDE 20 MG/1
20 TABLET ORAL DAILY
Qty: 90 TABLET | Refills: 1 | Status: SHIPPED | OUTPATIENT
Start: 2018-06-27 | End: 2019-02-07

## 2018-06-27 RX ORDER — BUPROPION HYDROCHLORIDE 300 MG/1
300 TABLET ORAL EVERY MORNING
Qty: 90 TABLET | Refills: 1 | Status: SHIPPED | OUTPATIENT
Start: 2018-06-27 | End: 2019-02-07

## 2018-06-27 ASSESSMENT — PATIENT HEALTH QUESTIONNAIRE - PHQ9
SUM OF ALL RESPONSES TO PHQ QUESTIONS 1-9: 0
10. IF YOU CHECKED OFF ANY PROBLEMS, HOW DIFFICULT HAVE THESE PROBLEMS MADE IT FOR YOU TO DO YOUR WORK, TAKE CARE OF THINGS AT HOME, OR GET ALONG WITH OTHER PEOPLE: NOT DIFFICULT AT ALL
SUM OF ALL RESPONSES TO PHQ QUESTIONS 1-9: 0

## 2018-06-27 ASSESSMENT — ANXIETY QUESTIONNAIRES
GAD7 TOTAL SCORE: 0
GAD7 TOTAL SCORE: 0
6. BECOMING EASILY ANNOYED OR IRRITABLE: NOT AT ALL
4. TROUBLE RELAXING: NOT AT ALL
5. BEING SO RESTLESS THAT IT IS HARD TO SIT STILL: NOT AT ALL
2. NOT BEING ABLE TO STOP OR CONTROL WORRYING: NOT AT ALL
3. WORRYING TOO MUCH ABOUT DIFFERENT THINGS: NOT AT ALL
7. FEELING AFRAID AS IF SOMETHING AWFUL MIGHT HAPPEN: NOT AT ALL
1. FEELING NERVOUS, ANXIOUS, OR ON EDGE: NOT AT ALL
GAD7 TOTAL SCORE: 0
7. FEELING AFRAID AS IF SOMETHING AWFUL MIGHT HAPPEN: NOT AT ALL

## 2018-06-27 NOTE — TELEPHONE ENCOUNTER
Patient noted has missed some days of his medication.     PHQ-9 SCORE 1/13/2018 4/24/2018 6/27/2018   Total Score - - -   Total Score MyChart 0 - 0   Total Score 0 15 0     DALI-7 SCORE 5/10/2017 4/24/2018 6/27/2018   Total Score - - -   Total Score - - 0 (minimal anxiety)   Total Score 0 15 0       Please advise on refill.  Not addressed at recent appointment.  Jennifer Reyna RN

## 2018-06-27 NOTE — TELEPHONE ENCOUNTER
Per protocol, will route encounter to be cosigned by provider for Verbal Orders.  Jennifer Reyna RN

## 2018-06-27 NOTE — TELEPHONE ENCOUNTER
PHQ-9 SCORE 1/13/2018 4/24/2018 6/27/2018   Total Score - - -   Total Score MyChart 0 - 0   Total Score 0 15 0     DALI-7 SCORE 5/10/2017 4/24/2018 6/27/2018   Total Score - - -   Total Score - - 0 (minimal anxiety)   Total Score 0 15 0     Verbal Orders by Mik Eng PA-C to refill prescription x 6 mos.     Per protocol, will route encounter to be cosigned by provider for Verbal Orders.  Jennifer Reyna RN

## 2018-06-27 NOTE — TELEPHONE ENCOUNTER
[facility]  Original authorizing provider: BLACK Mastersle Pawan would like a refill of the following medications:  buPROPion (WELLBUTRIN XL) 300 MG 24 hr tablet [Mik Eng PA-C]  citalopram (CELEXA) 20 MG tablet [Mik Eng PA-C]    Preferred pharmacy: [facility] [address], [address]    Comment:

## 2018-06-28 ASSESSMENT — ANXIETY QUESTIONNAIRES: GAD7 TOTAL SCORE: 0

## 2018-06-28 ASSESSMENT — PATIENT HEALTH QUESTIONNAIRE - PHQ9: SUM OF ALL RESPONSES TO PHQ QUESTIONS 1-9: 0

## 2019-02-07 ENCOUNTER — MYC REFILL (OUTPATIENT)
Dept: FAMILY MEDICINE | Facility: CLINIC | Age: 24
End: 2019-02-07

## 2019-02-07 DIAGNOSIS — F33.0 MAJOR DEPRESSIVE DISORDER, RECURRENT EPISODE, MILD (H): ICD-10-CM

## 2019-02-07 DIAGNOSIS — F42.9 OCD (OBSESSIVE COMPULSIVE DISORDER): ICD-10-CM

## 2019-02-08 RX ORDER — CITALOPRAM HYDROBROMIDE 20 MG/1
20 TABLET ORAL DAILY
Qty: 90 TABLET | Refills: 0 | Status: SHIPPED | OUTPATIENT
Start: 2019-02-08 | End: 2019-03-15

## 2019-02-08 RX ORDER — BUPROPION HYDROCHLORIDE 300 MG/1
300 TABLET ORAL EVERY MORNING
Qty: 90 TABLET | Refills: 0 | Status: SHIPPED | OUTPATIENT
Start: 2019-02-08 | End: 2019-03-15

## 2019-02-08 ASSESSMENT — PATIENT HEALTH QUESTIONNAIRE - PHQ9
SUM OF ALL RESPONSES TO PHQ QUESTIONS 1-9: 0
SUM OF ALL RESPONSES TO PHQ QUESTIONS 1-9: 0
10. IF YOU CHECKED OFF ANY PROBLEMS, HOW DIFFICULT HAVE THESE PROBLEMS MADE IT FOR YOU TO DO YOUR WORK, TAKE CARE OF THINGS AT HOME, OR GET ALONG WITH OTHER PEOPLE: NOT DIFFICULT AT ALL

## 2019-02-09 ASSESSMENT — PATIENT HEALTH QUESTIONNAIRE - PHQ9: SUM OF ALL RESPONSES TO PHQ QUESTIONS 1-9: 0

## 2019-03-14 ASSESSMENT — ENCOUNTER SYMPTOMS
COUGH: 0
PALPITATIONS: 0
ABDOMINAL PAIN: 0
DIZZINESS: 0
DIARRHEA: 0
DYSURIA: 0
FREQUENCY: 0
HEMATURIA: 0
EYE PAIN: 0
HEARTBURN: 0
HEADACHES: 0
PARESTHESIAS: 0
JOINT SWELLING: 0
CHILLS: 0
NERVOUS/ANXIOUS: 0
HEMATOCHEZIA: 0
SHORTNESS OF BREATH: 0
ARTHRALGIAS: 0
SORE THROAT: 0
MYALGIAS: 0
NAUSEA: 0
FEVER: 0
WEAKNESS: 0
CONSTIPATION: 0

## 2019-03-15 ENCOUNTER — OFFICE VISIT (OUTPATIENT)
Dept: FAMILY MEDICINE | Facility: CLINIC | Age: 24
End: 2019-03-15
Payer: COMMERCIAL

## 2019-03-15 VITALS
BODY MASS INDEX: 22.13 KG/M2 | OXYGEN SATURATION: 99 % | DIASTOLIC BLOOD PRESSURE: 68 MMHG | RESPIRATION RATE: 16 BRPM | HEART RATE: 57 BPM | HEIGHT: 68 IN | WEIGHT: 146 LBS | TEMPERATURE: 98.2 F | SYSTOLIC BLOOD PRESSURE: 118 MMHG

## 2019-03-15 DIAGNOSIS — Z00.00 ROUTINE GENERAL MEDICAL EXAMINATION AT A HEALTH CARE FACILITY: Primary | ICD-10-CM

## 2019-03-15 DIAGNOSIS — F42.9 OBSESSIVE-COMPULSIVE DISORDER, UNSPECIFIED TYPE: ICD-10-CM

## 2019-03-15 DIAGNOSIS — J30.2 SEASONAL ALLERGIC RHINITIS, UNSPECIFIED TRIGGER: ICD-10-CM

## 2019-03-15 DIAGNOSIS — F33.0 MAJOR DEPRESSIVE DISORDER, RECURRENT EPISODE, MILD (H): ICD-10-CM

## 2019-03-15 PROCEDURE — 99395 PREV VISIT EST AGE 18-39: CPT | Performed by: PHYSICIAN ASSISTANT

## 2019-03-15 RX ORDER — CETIRIZINE HYDROCHLORIDE 10 MG/1
10 TABLET ORAL EVERY EVENING
Qty: 30 TABLET | Refills: 11 | Status: SHIPPED | OUTPATIENT
Start: 2019-03-15 | End: 2020-10-23

## 2019-03-15 RX ORDER — CITALOPRAM HYDROBROMIDE 20 MG/1
20 TABLET ORAL DAILY
Qty: 90 TABLET | Refills: 1 | Status: SHIPPED | OUTPATIENT
Start: 2019-03-15 | End: 2019-05-30

## 2019-03-15 RX ORDER — BUPROPION HYDROCHLORIDE 300 MG/1
300 TABLET ORAL EVERY MORNING
Qty: 90 TABLET | Refills: 1 | Status: SHIPPED | OUTPATIENT
Start: 2019-03-15 | End: 2019-05-30

## 2019-03-15 ASSESSMENT — ENCOUNTER SYMPTOMS
CONSTIPATION: 0
HEMATOCHEZIA: 0
EYE PAIN: 0
ARTHRALGIAS: 0
DIARRHEA: 0
PARESTHESIAS: 0
CHILLS: 0
HEADACHES: 0
NERVOUS/ANXIOUS: 0
HEARTBURN: 0
NAUSEA: 0
JOINT SWELLING: 0
FREQUENCY: 0
DIZZINESS: 0
FEVER: 0
SHORTNESS OF BREATH: 0
DYSURIA: 0
HEMATURIA: 0
PALPITATIONS: 0
SORE THROAT: 0
ABDOMINAL PAIN: 0
MYALGIAS: 0
WEAKNESS: 0
COUGH: 0

## 2019-03-15 ASSESSMENT — MIFFLIN-ST. JEOR: SCORE: 1621.75

## 2019-03-15 NOTE — PROGRESS NOTES
SUBJECTIVE:   Rickey Villalta is a 23 year old male seen today who  has a past medical history of Diagnostic skin and sensitization tests (5/12/14 IgE tests pos. only for: tree pollens. ), Heart murmur (2011), Mild major depression (H) (5/1/2012), OCD (obsessive compulsive disorder), Oral allergy syndrome, Seasonal allergic conjunctivitis, and Seasonal allergic rhinitis. He also has no past medical history of Amblyopia, Arthritis, Bleeding disorder (H), Cancer (H), Cataract, Congestive heart failure, unspecified, COPD (chronic obstructive pulmonary disease) (H), CVA (cerebral infarction), Degenerative joint disease, Diabetes (H), Diabetes mellitus (H), Diabetic retinopathy (H), Glaucoma (increased eye pressure), Heart disease, History of blood transfusion, Hypertension, Inflammatory arthritis, Kidney disease, Retinal detachment, Senile macular degeneration, Strabismus, Stroke (H), Surgical complications, Thyroid disease, Type II or unspecified type diabetes mellitus without mention of complication, not stated as uncontrolled, Uncomplicated asthma, Unspecified asthma(493.90), or Uveitis.   who presents to clinic today for the following health issues:     Physical   Annual:     Getting at least 3 servings of Calcium per day:  Yes    Bi-annual eye exam:  Yes    Dental care twice a year:  Yes    Sleep apnea or symptoms of sleep apnea:  None    Diet:  Regular (no restrictions)    Frequency of exercise:  1 day/week    Duration of exercise:  Less than 15 minutes    Taking medications regularly:  Yes    Medication side effects:  None    Additional concerns today:  No    PHQ-2 Total Score: 0      No concerns    OCD and anxiety/depression are stable on medications.     Today's PHQ-2 Score:   PHQ-2 ( 1999 Pfizer) 3/14/2019   Q1: Little interest or pleasure in doing things 0   Q2: Feeling down, depressed or hopeless 0   PHQ-2 Score 0   Q1: Little interest or pleasure in doing things Not at all   Q2: Feeling down, depressed or  hopeless Not at all   PHQ-2 Score 0       Abuse: Current or Past(Physical, Sexual or Emotional)- No  Do you feel safe in your environment? Yes    Social History     Tobacco Use     Smoking status: Never Smoker     Smokeless tobacco: Never Used   Substance Use Topics     Alcohol use: Yes     Comment: Occ     Alcohol Use 3/14/2019   If you drink alcohol do you typically have greater than 3 drinks per day OR greater than 7 drinks per week? No     Last PSA: No results found for: PSA    Reviewed orders with patient. Reviewed health maintenance and updated orders accordingly - Yes  Labs reviewed in EPIC  BP Readings from Last 3 Encounters:   03/15/19 118/68   18 136/68   05/10/17 119/65    Wt Readings from Last 3 Encounters:   03/15/19 66.2 kg (146 lb)   18 68 kg (150 lb)   05/10/17 65.3 kg (144 lb)                  Patient Active Problem List   Diagnosis     OCD (obsessive compulsive disorder)     Heart murmur     Infected insect bite of right leg     Impacted cerumen     Seasonal allergic conjunctivitis     Seasonal allergic rhinitis     Diagnostic skin and sensitization tests     FHx: cardiovascular disease     Common wart     Major depressive disorder, recurrent episode, mild (H)     Tinea versicolor     Scrotal cyst     Past Surgical History:   Procedure Laterality Date     C STOMACH SURGERY PROCEDURE UNLISTED       EYE SURGERY      Lasik     HC OPEN TX RADIAL & ULNAR SHAFT FX FIX RADIUS & ULNA       HC PERC SKELETAL FIX UNSTABLE PHAL SHAFT FX W MANIP, EACH  10/19/2010    perc pinning Rt index prox phalanx fx     HERNIA REPAIR         Social History     Tobacco Use     Smoking status: Never Smoker     Smokeless tobacco: Never Used   Substance Use Topics     Alcohol use: Yes     Comment: Occ     Family History   Problem Relation Age of Onset     Diabetes Paternal Grandfather      Coronary Artery Disease Father      Hypertension Father      Myocardial Infarction Father 47             Heart  Disease Paternal Uncle 56     Macular Degeneration No family hx of      Glaucoma No family hx of      Thyroid Disease No family hx of      Cancer No family hx of      Cerebrovascular Disease No family hx of          Current Outpatient Medications   Medication Sig Dispense Refill     buPROPion (WELLBUTRIN XL) 300 MG 24 hr tablet Take 1 tablet (300 mg) by mouth every morning 90 tablet 1     cetirizine (ZYRTEC) 10 MG tablet Take 1 tablet (10 mg) by mouth every evening 30 tablet 11     citalopram (CELEXA) 20 MG tablet Take 1 tablet (20 mg) by mouth daily 90 tablet 1     Allergies   Allergen Reactions     Nkda [No Known Drug Allergies]        Reviewed and updated as needed this visit by clinical staff  Tobacco  Allergies  Meds  Med Hx  Surg Hx  Fam Hx  Soc Hx        Reviewed and updated as needed this visit by Provider          Past Medical History:   Diagnosis Date     Diagnostic skin and sensitization tests 5/12/14 IgE tests pos. only for: tree pollens.      Heart murmur 2011    Heart murmur noted     Mild major depression (H) 5/1/2012     OCD (obsessive compulsive disorder)      Oral allergy syndrome     raw carrots, celery, apples, peach--NOT a true food allergy and NO Epipen needed.     Seasonal allergic conjunctivitis     5/12/14 IgE tests pos. only for: tree pollens.      Seasonal allergic rhinitis       Past Surgical History:   Procedure Laterality Date     C STOMACH SURGERY PROCEDURE UNLISTED       EYE SURGERY  2014    Lasik     HC OPEN TX RADIAL & ULNAR SHAFT FX FIX RADIUS & ULNA       HC PERC SKELETAL FIX UNSTABLE PHAL SHAFT FX W MANIP, EACH  10/19/2010    perc pinning Rt index prox phalanx fx     HERNIA REPAIR  1996       Review of Systems   Constitutional: Negative for chills and fever.   HENT: Negative for congestion, ear pain, hearing loss and sore throat.    Eyes: Negative for pain and visual disturbance.   Respiratory: Negative for cough and shortness of breath.    Cardiovascular: Negative for  "chest pain, palpitations and peripheral edema.   Gastrointestinal: Negative for abdominal pain, constipation, diarrhea, heartburn, hematochezia and nausea.   Genitourinary: Negative for discharge, dysuria, frequency, genital sores, hematuria, impotence and urgency.   Musculoskeletal: Negative for arthralgias, joint swelling and myalgias.   Skin: Negative for rash.   Neurological: Negative for dizziness, weakness, headaches and paresthesias.   Psychiatric/Behavioral: Negative for mood changes. The patient is not nervous/anxious.          OBJECTIVE:   /68   Pulse 57   Temp 98.2  F (36.8  C) (Oral)   Resp 16   Ht 1.727 m (5' 8\")   Wt 66.2 kg (146 lb)   SpO2 99%   BMI 22.20 kg/m      Physical Exam  GENERAL: healthy, alert and no distress  EYES: Eyes grossly normal to inspection, PERRL and conjunctivae and sclerae normal  HENT: ear canals and TM's normal, nose and mouth without ulcers or lesions  NECK: no adenopathy, no asymmetry, masses, or scars and thyroid normal to palpation  RESP: lungs clear to auscultation - no rales, rhonchi or wheezes  CV: regular rate and rhythm, normal S1 S2, no S3 or S4, no murmur, click or rub, no peripheral edema and peripheral pulses strong  ABDOMEN: soft, nontender, no hepatosplenomegaly, no masses and bowel sounds normal   (male): normal male genitalia without lesions or urethral discharge, no hernia  MS: no gross musculoskeletal defects noted, no edema  SKIN: no suspicious lesions or rashes  NEURO: Normal strength and tone, mentation intact and speech normal  PSYCH: mentation appears normal, affect normal/bright    Diagnostic Test Results:  none     ASSESSMENT/PLAN:       ICD-10-CM    1. Routine general medical examination at a health care facility Z00.00    2. Obsessive-compulsive disorder, unspecified type F42.9 citalopram (CELEXA) 20 MG tablet     buPROPion (WELLBUTRIN XL) 300 MG 24 hr tablet   3. Major depressive disorder, recurrent episode, mild (H) F33.0 citalopram " "(CELEXA) 20 MG tablet     buPROPion (WELLBUTRIN XL) 300 MG 24 hr tablet   4. Seasonal allergic rhinitis, unspecified trigger J30.2 cetirizine (ZYRTEC) 10 MG tablet     1. Work on Healthy diet and exercise. Getting heart rate elevated for 30 mins most days of week.  2-3. Stable ok for refills. Recheck e-visit in 6 months.   4. Ok for refills.     COUNSELING:   Reviewed preventive health counseling, as reflected in patient instructions       Regular exercise       Healthy diet/nutrition    BP Readings from Last 1 Encounters:   03/15/19 118/68     Estimated body mass index is 22.2 kg/m  as calculated from the following:    Height as of this encounter: 1.727 m (5' 8\").    Weight as of this encounter: 66.2 kg (146 lb).           reports that  has never smoked. he has never used smokeless tobacco.      Counseling Resources:  ATP IV Guidelines  Pooled Cohorts Equation Calculator  FRAX Risk Assessment  ICSI Preventive Guidelines  Dietary Guidelines for Americans, 2010  USDA's MyPlate  ASA Prophylaxis  Lung CA Screening    Mik Eng PA-C  [facility]  "

## 2019-03-29 ENCOUNTER — MYC REFILL (OUTPATIENT)
Dept: FAMILY MEDICINE | Facility: CLINIC | Age: 24
End: 2019-03-29

## 2019-03-29 DIAGNOSIS — J30.2 SEASONAL ALLERGIC RHINITIS, UNSPECIFIED TRIGGER: ICD-10-CM

## 2019-03-29 RX ORDER — CETIRIZINE HYDROCHLORIDE 10 MG/1
10 TABLET ORAL EVERY EVENING
Qty: 30 TABLET | Refills: 11 | Status: CANCELLED | OUTPATIENT
Start: 2019-03-29

## 2019-03-29 NOTE — TELEPHONE ENCOUNTER
cetirizine (ZYRTEC) 10 MG tablet 30 tablet 11 3/15/2019  No   Sig - Route: Take 1 tablet (10 mg) by mouth every evening - Oral   Sent to pharmacy as: cetirizine (ZYRTEC) 10 MG tablet   Class: E-Prescribe   Order: [id number]   E-Prescribing Status: Receipt confirmed by pharmacy (3/15/2019  8:46 AM CDT)     The requested prescription was signed by provider on 3/15/19.  Erika Srinivasan RN

## 2019-05-07 ENCOUNTER — TELEPHONE (OUTPATIENT)
Dept: FAMILY MEDICINE | Facility: CLINIC | Age: 24
End: 2019-05-07

## 2019-05-07 NOTE — TELEPHONE ENCOUNTER
phq9 done 2/8/19.  Was 0.  Not within index date.  Sent to patient again via [employer].  Selam Niño RN

## 2019-05-07 NOTE — TELEPHONE ENCOUNTER
PHQ-9 due now for patient ( 4-8 months from index date)  Index date:       4-24-18  Index PHQ9 :   15  FU start date:   2-23-19  FU End date :   6-23-19    RN- Contact patient for PHQ-9. Remission considered if follow up PHQ-9 less than 5.  If greater than 5 consider follow up appointment, e-visit for medication follow up and evaluation.

## 2019-05-20 ASSESSMENT — PATIENT HEALTH QUESTIONNAIRE - PHQ9: SUM OF ALL RESPONSES TO PHQ QUESTIONS 1-9: 0

## 2019-05-30 ENCOUNTER — MYC REFILL (OUTPATIENT)
Dept: FAMILY MEDICINE | Facility: CLINIC | Age: 24
End: 2019-05-30

## 2019-05-30 DIAGNOSIS — F42.9 OBSESSIVE-COMPULSIVE DISORDER, UNSPECIFIED TYPE: ICD-10-CM

## 2019-05-30 DIAGNOSIS — F33.0 MAJOR DEPRESSIVE DISORDER, RECURRENT EPISODE, MILD (H): ICD-10-CM

## 2019-05-31 RX ORDER — BUPROPION HYDROCHLORIDE 300 MG/1
300 TABLET ORAL EVERY MORNING
Qty: 90 TABLET | Refills: 1 | Status: SHIPPED | OUTPATIENT
Start: 2019-05-31 | End: 2019-09-09

## 2019-05-31 RX ORDER — CITALOPRAM HYDROBROMIDE 20 MG/1
20 TABLET ORAL DAILY
Qty: 90 TABLET | Refills: 1 | Status: SHIPPED | OUTPATIENT
Start: 2019-05-31 | End: 2019-09-09

## 2019-09-12 ENCOUNTER — OFFICE VISIT (OUTPATIENT)
Dept: FAMILY MEDICINE | Facility: CLINIC | Age: 24
End: 2019-09-12
Payer: COMMERCIAL

## 2019-09-12 ENCOUNTER — ANCILLARY PROCEDURE (OUTPATIENT)
Dept: GENERAL RADIOLOGY | Facility: CLINIC | Age: 24
End: 2019-09-12
Attending: PHYSICIAN ASSISTANT
Payer: COMMERCIAL

## 2019-09-12 VITALS
TEMPERATURE: 98 F | HEART RATE: 63 BPM | WEIGHT: 145 LBS | BODY MASS INDEX: 21.98 KG/M2 | RESPIRATION RATE: 16 BRPM | OXYGEN SATURATION: 99 % | HEIGHT: 68 IN | DIASTOLIC BLOOD PRESSURE: 67 MMHG | SYSTOLIC BLOOD PRESSURE: 112 MMHG

## 2019-09-12 DIAGNOSIS — M25.511 CHRONIC RIGHT SHOULDER PAIN: Primary | ICD-10-CM

## 2019-09-12 DIAGNOSIS — M25.511 CHRONIC RIGHT SHOULDER PAIN: ICD-10-CM

## 2019-09-12 DIAGNOSIS — G89.29 CHRONIC RIGHT SHOULDER PAIN: ICD-10-CM

## 2019-09-12 DIAGNOSIS — G89.29 CHRONIC RIGHT SHOULDER PAIN: Primary | ICD-10-CM

## 2019-09-12 PROCEDURE — 99213 OFFICE O/P EST LOW 20 MIN: CPT | Performed by: PHYSICIAN ASSISTANT

## 2019-09-12 PROCEDURE — 73030 X-RAY EXAM OF SHOULDER: CPT | Mod: RT

## 2019-09-12 ASSESSMENT — PAIN SCALES - GENERAL: PAINLEVEL: MILD PAIN (3)

## 2019-09-12 ASSESSMENT — MIFFLIN-ST. JEOR: SCORE: 1612.22

## 2019-09-12 NOTE — PROGRESS NOTES
"SUBJECTIVE:   Rickey Villalta is a 24 year old male seen here today for his right Shoulder   What happened: no recently injury      Onset: many years off and on     Description:   Character: Sharp with certain positions    Intensity: variable     Progression of Symptoms: worse with activity but then gets slightly better     Accompanying Signs & Symptoms:  Other symptoms: feels shoulder popping    History:   Previous similar pain: YES      Precipitating factors:   Trauma or overuse: YES- possibly     Alleviating factors:  Improved by: ibuprofen        Therapies Tried and outcome: ibuprofen helps slightly, rest  Pain since highschool. Was a pole vaulter and played football. No known injury.   Pain with over head activities and rotation. Mild clicking at times.     PFSH:  Past Medical, social, family histories, medications, and allergies were reviewed and updated.     OBJECTIVE:  /67   Pulse 63   Temp 98  F (36.7  C) (Oral)   Resp 16   Ht 1.727 m (5' 8\")   Wt 65.8 kg (145 lb)   SpO2 99%   BMI 22.05 kg/m    General:  Rickey is awake, alert, and cooperative.  NAD.  Shoulder Exam: Inspection: no swelling, bruising, discoloration, or obvious deformity or asymmetry  Tender: none  Range of Motion  Active:all normal  Passive: all normal  Strength: rotator cuff strength full  Special tests:  Positive: Norman, O'Briens and load and shift  Negative: Neamy    X-ray right shoulder: neg. pending radiology    ASSESSMENT:     ICD-10-CM    1. Chronic right shoulder pain M25.511 XR Shoulder Right G/E 3 Views    G89.29 [facility] CHIR[facility] REFERRAL       PLAN:   ice or cold packs 20 minutes every 2-3 hrs as needed to relieve pain and swelling, for the first 2 days. Then can apply heat 20 minutes every 2-3 hrs (avoid sleeping on heating pad) there after as needed.   If you can tolerate, start non-steroidal anti-inflammatory medication like: Ibuprofen 600-800 mg three times daily or Aleve 2 tablets of over the counter " strength twice a day as needed.   Tylenol can help with pain also.    Active range of motion exercises encouraged  Activity modification trying to avoid activities that cause you pain.   Possible labral pathology.   Follow up 2-3 months after PHYSICAL THERAPY if not improving.     Mik Eng PA-C

## 2019-09-12 NOTE — RESULT ENCOUNTER NOTE
Mr. Villalta,    X-ray was read as normal.     Please contact the clinic if you have additional questions.  Thank you.    Sincerely,    Mik Eng PA-C

## 2019-12-28 ENCOUNTER — MYC REFILL (OUTPATIENT)
Dept: FAMILY MEDICINE | Facility: CLINIC | Age: 24
End: 2019-12-28

## 2019-12-28 DIAGNOSIS — F42.9 OBSESSIVE-COMPULSIVE DISORDER, UNSPECIFIED TYPE: ICD-10-CM

## 2019-12-28 DIAGNOSIS — F33.0 MAJOR DEPRESSIVE DISORDER, RECURRENT EPISODE, MILD (H): ICD-10-CM

## 2019-12-28 RX ORDER — BUPROPION HYDROCHLORIDE 300 MG/1
300 TABLET ORAL EVERY MORNING
Qty: 90 TABLET | Refills: 0 | Status: CANCELLED | OUTPATIENT
Start: 2019-12-28

## 2019-12-28 RX ORDER — CITALOPRAM HYDROBROMIDE 20 MG/1
20 TABLET ORAL DAILY
Qty: 90 TABLET | Refills: 0 | Status: CANCELLED | OUTPATIENT
Start: 2019-12-28

## 2020-04-03 ENCOUNTER — MYC REFILL (OUTPATIENT)
Dept: FAMILY MEDICINE | Facility: CLINIC | Age: 25
End: 2020-04-03

## 2020-04-03 DIAGNOSIS — F42.9 OBSESSIVE-COMPULSIVE DISORDER, UNSPECIFIED TYPE: ICD-10-CM

## 2020-04-03 DIAGNOSIS — F33.0 MAJOR DEPRESSIVE DISORDER, RECURRENT EPISODE, MILD (H): ICD-10-CM

## 2020-04-03 RX ORDER — CITALOPRAM HYDROBROMIDE 20 MG/1
20 TABLET ORAL DAILY
Qty: 90 TABLET | Refills: 0 | Status: SHIPPED | OUTPATIENT
Start: 2020-04-03 | End: 2020-07-14

## 2020-04-03 RX ORDER — BUPROPION HYDROCHLORIDE 300 MG/1
300 TABLET ORAL EVERY MORNING
Qty: 90 TABLET | Refills: 0 | Status: SHIPPED | OUTPATIENT
Start: 2020-04-03 | End: 2020-07-14

## 2020-04-06 ENCOUNTER — MYC REFILL (OUTPATIENT)
Dept: FAMILY MEDICINE | Facility: CLINIC | Age: 25
End: 2020-04-06

## 2020-04-06 DIAGNOSIS — F33.0 MAJOR DEPRESSIVE DISORDER, RECURRENT EPISODE, MILD (H): ICD-10-CM

## 2020-04-06 DIAGNOSIS — F42.9 OBSESSIVE-COMPULSIVE DISORDER, UNSPECIFIED TYPE: ICD-10-CM

## 2020-04-06 RX ORDER — BUPROPION HYDROCHLORIDE 300 MG/1
300 TABLET ORAL EVERY MORNING
Qty: 90 TABLET | Refills: 0 | Status: CANCELLED | OUTPATIENT
Start: 2020-04-06

## 2020-04-06 RX ORDER — CITALOPRAM HYDROBROMIDE 20 MG/1
20 TABLET ORAL DAILY
Qty: 90 TABLET | Refills: 0 | Status: CANCELLED | OUTPATIENT
Start: 2020-04-06

## 2020-07-14 ENCOUNTER — MYC REFILL (OUTPATIENT)
Dept: FAMILY MEDICINE | Facility: CLINIC | Age: 25
End: 2020-07-14

## 2020-07-14 DIAGNOSIS — F42.9 OBSESSIVE-COMPULSIVE DISORDER, UNSPECIFIED TYPE: ICD-10-CM

## 2020-07-14 DIAGNOSIS — F33.0 MAJOR DEPRESSIVE DISORDER, RECURRENT EPISODE, MILD (H): ICD-10-CM

## 2020-07-14 NOTE — LETTER
July 15, 2020    Rickey Villalta  [address] [facility] [address]    Dear Rickey,       We recently received a refill request for citalopram (CELEXA) & buPROPion (WELLBUTRIN XL.  We have refilled this for a one time 90 day supply only because you are due for a:    Mood follow up office visit for FUTURE REFILLS.      Please call at your earliest convenience so that there will not be a delay with your future refills.          Thank you,   Your [facility] Team/[facility]  [phone]       Pt calls - had appt yesterday.  Calls to request refill on [employer].  Order placed.

## 2020-07-15 RX ORDER — CITALOPRAM HYDROBROMIDE 20 MG/1
20 TABLET ORAL DAILY
Qty: 90 TABLET | Refills: 0 | Status: SHIPPED | OUTPATIENT
Start: 2020-07-15 | End: 2020-10-23

## 2020-07-15 RX ORDER — BUPROPION HYDROCHLORIDE 300 MG/1
300 TABLET ORAL EVERY MORNING
Qty: 90 TABLET | Refills: 0 | Status: SHIPPED | OUTPATIENT
Start: 2020-07-15 | End: 2020-10-23

## 2020-07-15 NOTE — TELEPHONE ENCOUNTER
Routing refill request to provider for review/approval because:  Patient needs to be seen because it has been more than 1 year since last office visit.  Tiffany Paulino BSN, RN

## 2020-10-15 NOTE — PROGRESS NOTES
"  3  SUBJECTIVE:   CC: Rickey Villalta is an 25 year old male who presents for preventive health visit.     {Split Bill scripting  The purpose of this visit is to discuss your medical history and prevent health problems before you are sick. You may be responsible for a co-pay, coinsurance, or deductible if your visit today includes services such as checking on a sore throat, having an x-ray or lab test, or treating and evaluating a new or existing condition :220203}  Patient has been advised of split billing requirements and indicates understanding: {Yes and No:465189}  Healthy Habits:    Do you get at least three servings of calcium containing foods daily (dairy, green leafy vegetables, etc.)? { :751206::\"yes\"}    Amount of exercise or daily activities, outside of work: { :726143}    Problems taking medications regularly { :586475::\"No\"}    Medication side effects: { :074101::\"No\"}    Have you had an eye exam in the past two years? { :294625}    Do you see a dentist twice per year? { :201207}    Do you have sleep apnea, excessive snoring or daytime drowsiness?{ :661659}  {Outside tests to abstract? :333784}    {additional problems to add (Optional):262011}    Today's PHQ-2 Score:   PHQ-2 ( 1999 Pfizer) 3/14/2019 4/23/2018   Q1: Little interest or pleasure in doing things 0 0   Q2: Feeling down, depressed or hopeless 0 0   PHQ-2 Score 0 0   Q1: Little interest or pleasure in doing things Not at all Not at all   Q2: Feeling down, depressed or hopeless Not at all Not at all   PHQ-2 Score 0 0     {PHQ-2 LOOK IN ASSESSMENTS (Optional) :087350}  Abuse: Current or Past(Physical, Sexual or Emotional)- {YES/NO/NA:990116}  Do you feel safe in your environment? {YES/NO/NA:584980}        Social History     Tobacco Use     Smoking status: Never Smoker     Smokeless tobacco: Never Used   Substance Use Topics     Alcohol use: Yes     Comment: Occ     If you drink alcohol do you typically have >3 drinks per day or >7 drinks per week? " "{ETOH :763168}                      Last PSA: No results found for: PSA    Reviewed orders with patient. Reviewed health maintenance and updated orders accordingly - {Yes/No:846605::\"Yes\"}  {Chronicprobdata (Optional):671257}    Reviewed and updated as needed this visit by clinical staff                 Reviewed and updated as needed this visit by Provider                {HISTORY OPTIONS (Optional):892196}    ROS:  { :485408::\"CONSTITUTIONAL: NEGATIVE for fever, chills, change in weight\",\"INTEGUMENTARY/SKIN: NEGATIVE for worrisome rashes, moles or lesions\",\"EYES: NEGATIVE for vision changes or irritation\",\"ENT: NEGATIVE for ear, mouth and throat problems\",\"RESP: NEGATIVE for significant cough or SOB\",\"CV: NEGATIVE for chest pain, palpitations or peripheral edema\",\"GI: NEGATIVE for nausea, abdominal pain, heartburn, or change in bowel habits\",\" male: negative for dysuria, hematuria, decreased urinary stream, erectile dysfunction, urethral discharge\",\"MUSCULOSKELETAL: NEGATIVE for significant arthralgias or myalgia\",\"NEURO: NEGATIVE for weakness, dizziness or paresthesias\",\"PSYCHIATRIC: NEGATIVE for changes in mood or affect\"}    OBJECTIVE:   There were no vitals taken for this visit.  EXAM:  {Exam Choices:631707}    {Diagnostic Test Results (Optional):083352::\"Diagnostic Test Results:\",\"Labs reviewed in Epic\"}    ASSESSMENT/PLAN:   {Diag Picklist:470237}    Patient has been advised of split billing requirements and indicates understanding: {YES / NO:493432::\"Yes\"}  COUNSELING:  {MALE COUNSELING MESSAGES:677661::\"Reviewed preventive health counseling, as reflected in patient instructions\"}    Estimated body mass index is 22.05 kg/m  as calculated from the following:    Height as of 9/12/19: 1.727 m (5' 8\").    Weight as of 9/12/19: 65.8 kg (145 lb).    {Weight Management Plan (ACO) Complete if BMI is abnormal-  Ages 18-64  BMI >24.9.  Age 65+ with BMI <23 or >30 (Optional):622748}    He reports that he has never " smoked. He has never used smokeless tobacco.      Counseling Resources:  ATP IV Guidelines  Pooled Cohorts Equation Calculator  FRAX Risk Assessment  ICSI Preventive Guidelines  Dietary Guidelines for Americans, 2010  USDA's MyPlate  ASA Prophylaxis  Lung CA Screening    BLACK [facility]

## 2020-10-23 ENCOUNTER — OFFICE VISIT (OUTPATIENT)
Dept: FAMILY MEDICINE | Facility: CLINIC | Age: 25
End: 2020-10-23
Payer: COMMERCIAL

## 2020-10-23 VITALS
TEMPERATURE: 97.7 F | SYSTOLIC BLOOD PRESSURE: 110 MMHG | RESPIRATION RATE: 14 BRPM | WEIGHT: 143 LBS | BODY MASS INDEX: 21.67 KG/M2 | HEART RATE: 58 BPM | OXYGEN SATURATION: 98 % | HEIGHT: 68 IN | DIASTOLIC BLOOD PRESSURE: 70 MMHG

## 2020-10-23 DIAGNOSIS — F42.9 OBSESSIVE-COMPULSIVE DISORDER, UNSPECIFIED TYPE: ICD-10-CM

## 2020-10-23 DIAGNOSIS — J30.2 SEASONAL ALLERGIC RHINITIS, UNSPECIFIED TRIGGER: ICD-10-CM

## 2020-10-23 DIAGNOSIS — Z00.00 ROUTINE GENERAL MEDICAL EXAMINATION AT A HEALTH CARE FACILITY: Primary | ICD-10-CM

## 2020-10-23 DIAGNOSIS — F33.0 MAJOR DEPRESSIVE DISORDER, RECURRENT EPISODE, MILD (H): ICD-10-CM

## 2020-10-23 PROCEDURE — 96127 BRIEF EMOTIONAL/BEHAV ASSMT: CPT | Performed by: PHYSICIAN ASSISTANT

## 2020-10-23 PROCEDURE — 99213 OFFICE O/P EST LOW 20 MIN: CPT | Mod: 25 | Performed by: PHYSICIAN ASSISTANT

## 2020-10-23 PROCEDURE — 99395 PREV VISIT EST AGE 18-39: CPT | Performed by: PHYSICIAN ASSISTANT

## 2020-10-23 RX ORDER — CETIRIZINE HYDROCHLORIDE 10 MG/1
10 TABLET ORAL EVERY EVENING
Qty: 30 TABLET | Refills: 11 | Status: SHIPPED | OUTPATIENT
Start: 2020-10-23

## 2020-10-23 RX ORDER — CITALOPRAM HYDROBROMIDE 20 MG/1
20 TABLET ORAL DAILY
Qty: 90 TABLET | Refills: 3 | Status: SHIPPED | OUTPATIENT
Start: 2020-10-23 | End: 2021-02-12

## 2020-10-23 RX ORDER — BUPROPION HYDROCHLORIDE 300 MG/1
300 TABLET ORAL EVERY MORNING
Qty: 90 TABLET | Refills: 4 | Status: SHIPPED | OUTPATIENT
Start: 2020-10-23 | End: 2021-02-12

## 2020-10-23 ASSESSMENT — ANXIETY QUESTIONNAIRES
5. BEING SO RESTLESS THAT IT IS HARD TO SIT STILL: NOT AT ALL
1. FEELING NERVOUS, ANXIOUS, OR ON EDGE: NOT AT ALL
IF YOU CHECKED OFF ANY PROBLEMS ON THIS QUESTIONNAIRE, HOW DIFFICULT HAVE THESE PROBLEMS MADE IT FOR YOU TO DO YOUR WORK, TAKE CARE OF THINGS AT HOME, OR GET ALONG WITH OTHER PEOPLE: NOT DIFFICULT AT ALL
3. WORRYING TOO MUCH ABOUT DIFFERENT THINGS: NOT AT ALL
GAD7 TOTAL SCORE: 0
6. BECOMING EASILY ANNOYED OR IRRITABLE: NOT AT ALL
7. FEELING AFRAID AS IF SOMETHING AWFUL MIGHT HAPPEN: NOT AT ALL
2. NOT BEING ABLE TO STOP OR CONTROL WORRYING: NOT AT ALL

## 2020-10-23 ASSESSMENT — PATIENT HEALTH QUESTIONNAIRE - PHQ9
SUM OF ALL RESPONSES TO PHQ QUESTIONS 1-9: 0
5. POOR APPETITE OR OVEREATING: NOT AT ALL

## 2020-10-23 ASSESSMENT — MIFFLIN-ST. JEOR: SCORE: 1598.14

## 2020-10-23 ASSESSMENT — ENCOUNTER SYMPTOMS
MYALGIAS: 0
SORE THROAT: 0
PARESTHESIAS: 0
ARTHRALGIAS: 0
DIZZINESS: 0
FREQUENCY: 0
SHORTNESS OF BREATH: 0
WEAKNESS: 0
NAUSEA: 0
HEMATOCHEZIA: 0
FEVER: 0
DYSURIA: 0
HEADACHES: 0
PALPITATIONS: 0
NERVOUS/ANXIOUS: 0
DIARRHEA: 0
CHILLS: 0
EYE PAIN: 0
COUGH: 0
HEARTBURN: 0
ABDOMINAL PAIN: 0
HEMATURIA: 0
CONSTIPATION: 0
JOINT SWELLING: 0

## 2020-10-23 NOTE — PROGRESS NOTES
SUBJECTIVE:   CC: Rickey Villalta is an 25 year old male who presents for preventative health visit.     Patient has been advised of split billing requirements and indicates understanding: Yes     Healthy Habits:     Getting at least 3 servings of Calcium per day:  Yes    Bi-annual eye exam:  NO    Dental care twice a year:  Yes    Sleep apnea or symptoms of sleep apnea:  None    Diet:  Regular (no restrictions)    Frequency of exercise:  1 day/week    Duration of exercise:  15-30 minutes    Taking medications regularly:  Yes    Medication side effects:  None    PHQ-2 Total Score: 0    Additional concerns today:  No    Depression Followup    How are you doing with your depression since your last visit? Stable, doing well.     Are you having other symptoms that might be associated with depression? No    Have you had a significant life event?  No     Are you feeling anxious or having panic attacks?   No    Do you have any concerns with your use of alcohol or other drugs? No    Social History     Tobacco Use     Smoking status: Never Smoker     Smokeless tobacco: Never Used   Substance Use Topics     Alcohol use: Yes     Comment: infrequent social use     Drug use: No     PHQ 5/30/2019 12/30/2019 10/23/2020   PHQ-9 Total Score 0 0 0   Q9: Thoughts of better off dead/self-harm past 2 weeks Not at all Not at all Not at all     DALI-7 SCORE 4/24/2018 6/27/2018 10/23/2020   Total Score - - -   Total Score - 0 (minimal anxiety) -   Total Score 15 0 0     Last PHQ-9 10/23/2020   1.  Little interest or pleasure in doing things 0   2.  Feeling down, depressed, or hopeless 0   3.  Trouble falling or staying asleep, or sleeping too much 0   4.  Feeling tired or having little energy 0   5.  Poor appetite or overeating 0   6.  Feeling bad about yourself 0   7.  Trouble concentrating 0   8.  Moving slowly or restless 0   Q9: Thoughts of better off dead/self-harm past 2 weeks 0   PHQ-9 Total Score 0   Difficulty at work, home, or with  people Not difficult at all     DALI-7  10/23/2020   1. Feeling nervous, anxious, or on edge 0   2. Not being able to stop or control worrying 0   3. Worrying too much about different things 0   4. Trouble relaxing 0   5. Being so restless that it is hard to sit still 0   6. Becoming easily annoyed or irritable 0   7. Feeling afraid, as if something awful might happen 0   DALI-7 Total Score 0   If you checked any problems, how difficult have they made it for you to do your work, take care of things at home, or get along with other people? Not difficult at all     In the past two weeks have you had thoughts of suicide or self-harm?  No.    Do you have concerns about your personal safety or the safety of others?   No    Suicide Assessment Five-step Evaluation and Treatment (SAFE-T)    History of seasonal allergies that  Zyrtec helpful.        Today's PHQ-2 Score:   PHQ-2 ( 1999 Pfizer) 10/23/2020   Q1: Little interest or pleasure in doing things 0   Q2: Feeling down, depressed or hopeless 0   PHQ-2 Score 0   Q1: Little interest or pleasure in doing things Not at all   Q2: Feeling down, depressed or hopeless Not at all   PHQ-2 Score 0       Abuse: Current or Past(Physical, Sexual or Emotional)- No  Do you feel safe in your environment? Yes        Social History     Tobacco Use     Smoking status: Never Smoker     Smokeless tobacco: Never Used   Substance Use Topics     Alcohol use: Yes     Comment: infrequent social use     If you drink alcohol do you typically have >3 drinks per day or >7 drinks per week? No    Alcohol Use 10/23/2020   Prescreen: >3 drinks/day or >7 drinks/week? No   Prescreen: >3 drinks/day or >7 drinks/week? -   No flowsheet data found.    Last PSA: No results found for: PSA    Reviewed orders with patient. Reviewed health maintenance and updated orders accordingly - Yes  Lab work is in process  Labs reviewed in EPIC  BP Readings from Last 3 Encounters:   10/23/20 110/70   09/12/19 112/67   03/15/19  118/68    Wt Readings from Last 3 Encounters:   10/23/20 64.9 kg (143 lb)   19 65.8 kg (145 lb)   03/15/19 66.2 kg (146 lb)                  Patient Active Problem List   Diagnosis     OCD (obsessive compulsive disorder)     Heart murmur     Infected insect bite of right leg     Impacted cerumen     Seasonal allergic conjunctivitis     Seasonal allergic rhinitis     Diagnostic skin and sensitization tests     FHx: cardiovascular disease     Common wart     Major depressive disorder, recurrent episode, mild (H)     Tinea versicolor     Scrotal cyst     Past Surgical History:   Procedure Laterality Date     C STOMACH SURGERY PROCEDURE UNLISTED       EYE SURGERY      Lasik     HC OPEN TX RADIAL & ULNAR SHAFT FX FIX RADIUS & ULNA       HC PERC SKELETAL FIX UNSTABLE PHAL SHAFT FX W MANIP, EACH  10/19/2010    perc pinning Rt index prox phalanx fx     HERNIA REPAIR         Social History     Tobacco Use     Smoking status: Never Smoker     Smokeless tobacco: Never Used   Substance Use Topics     Alcohol use: Yes     Comment: infrequent social use     Family History   Problem Relation Age of Onset     Diabetes Paternal Grandfather      Coronary Artery Disease Father      Hypertension Father      Myocardial Infarction Father 47             Heart Disease Paternal Uncle 56     Macular Degeneration No family hx of      Glaucoma No family hx of      Thyroid Disease No family hx of      Cancer No family hx of      Cerebrovascular Disease No family hx of          Current Outpatient Medications   Medication Sig Dispense Refill     buPROPion (WELLBUTRIN XL) 300 MG 24 hr tablet Take 1 tablet (300 mg) by mouth every morning 90 tablet 4     cetirizine (ZYRTEC) 10 MG tablet Take 1 tablet (10 mg) by mouth every evening 30 tablet 11     citalopram (CELEXA) 20 MG tablet Take 1 tablet (20 mg) by mouth daily 90 tablet 3     Allergies   Allergen Reactions     Nkda [No Known Drug Allergies]        Reviewed and updated as  needed this visit by clinical staff  Tobacco  Allergies  Meds   Med Hx  Surg Hx  Fam Hx  Soc Hx        Reviewed and updated as needed this visit by Provider                  Past Medical History:   Diagnosis Date     Depressive disorder      Diagnostic skin and sensitization tests 5/12/14 IgE tests pos. only for: tree pollens.      Heart murmur 2011    Heart murmur noted     Mild major depression (H) 5/1/2012     OCD (obsessive compulsive disorder)      Oral allergy syndrome     raw carrots, celery, apples, peach--NOT a true food allergy and NO Epipen needed.     Seasonal allergic conjunctivitis     5/12/14 IgE tests pos. only for: tree pollens.      Seasonal allergic rhinitis       Past Surgical History:   Procedure Laterality Date     C STOMACH SURGERY PROCEDURE UNLISTED       EYE SURGERY  2014    Lasik     HC OPEN TX RADIAL & ULNAR SHAFT FX FIX RADIUS & ULNA       HC PERC SKELETAL FIX UNSTABLE PHAL SHAFT FX W MANIP, EACH  10/19/2010    perc pinning Rt index prox phalanx fx     HERNIA REPAIR  1996       Review of Systems   Constitutional: Negative for chills and fever.   HENT: Negative for congestion, ear pain, hearing loss and sore throat.    Eyes: Negative for pain and visual disturbance.   Respiratory: Negative for cough and shortness of breath.    Cardiovascular: Negative for chest pain, palpitations and peripheral edema.   Gastrointestinal: Negative for abdominal pain, constipation, diarrhea, heartburn, hematochezia and nausea.   Genitourinary: Negative for discharge, dysuria, frequency, genital sores, hematuria, impotence and urgency.   Musculoskeletal: Negative for arthralgias, joint swelling and myalgias.   Skin: Negative for rash.   Neurological: Negative for dizziness, weakness, headaches and paresthesias.   Psychiatric/Behavioral: Negative for mood changes. The patient is not nervous/anxious.      CONSTITUTIONAL: NEGATIVE for fever, chills, change in weight  INTEGUMENTARY/SKIN: NEGATIVE for  "worrisome rashes, moles or lesions  EYES: NEGATIVE for vision changes or irritation  ENT: NEGATIVE for ear, mouth and throat problems  RESP: NEGATIVE for significant cough or SOB  CV: NEGATIVE for chest pain, palpitations or peripheral edema  GI: NEGATIVE for nausea, abdominal pain, heartburn, or change in bowel habits   male: negative for dysuria, hematuria, decreased urinary stream, erectile dysfunction, urethral discharge  MUSCULOSKELETAL: NEGATIVE for significant arthralgias or myalgia  NEURO: NEGATIVE for weakness, dizziness or paresthesias  PSYCHIATRIC: NEGATIVE for changes in mood or affect    OBJECTIVE:   /70   Pulse 58   Temp 97.7  F (36.5  C) (Tympanic)   Resp 14   Ht 1.727 m (5' 8\")   Wt 64.9 kg (143 lb)   SpO2 98%   BMI 21.74 kg/m      Physical Exam  GENERAL: healthy, alert and no distress  EYES: Eyes grossly normal to inspection, PERRL and conjunctivae and sclerae normal  HENT: ear canals and TM's normal, nose and mouth without ulcers or lesions  NECK: no adenopathy, no asymmetry, masses, or scars and thyroid normal to palpation  RESP: lungs clear to auscultation - no rales, rhonchi or wheezes  CV: regular rate and rhythm, normal S1 S2, no S3 or S4, no murmur, click or rub, no peripheral edema and peripheral pulses strong  ABDOMEN: soft, nontender, no hepatosplenomegaly, no masses and bowel sounds normal   (male): normal male genitalia without lesions or urethral discharge, no hernia  MS: no gross musculoskeletal defects noted, no edema  SKIN: no suspicious lesions or rashes  NEURO: Normal strength and tone, mentation intact and speech normal  PSYCH: mentation appears normal, affect normal/bright    Diagnostic Test Results:  Labs reviewed in Epic    ASSESSMENT/PLAN:       ICD-10-CM    1. Routine general medical examination at a health care facility  Z00.00    2. Obsessive-compulsive disorder, unspecified type  F42.9 buPROPion (WELLBUTRIN XL) 300 MG 24 hr tablet     citalopram (CELEXA) " "20 MG tablet     OFFICE/OUTPT VISIT,EST,LEVL III   3. Major depressive disorder, recurrent episode, mild (H)  F33.0 buPROPion (WELLBUTRIN XL) 300 MG 24 hr tablet     citalopram (CELEXA) 20 MG tablet     OFFICE/OUTPT VISIT,EST,LEVL III   4. Seasonal allergic rhinitis, unspecified trigger  J30.2 cetirizine (ZYRTEC) 10 MG tablet     OFFICE/OUTPT VISIT,EST,LEVL III   medical conditions are stable. meds refilled.  Work on Healthy diet and exercise. Getting heart rate elevated for 30 mins most days of week. warning signs discussed.   Recheck yearly.       Patient has been advised of split billing requirements and indicates understanding: Yes  COUNSELING:   Reviewed preventive health counseling, as reflected in patient instructions    Estimated body mass index is 21.74 kg/m  as calculated from the following:    Height as of this encounter: 1.727 m (5' 8\").    Weight as of this encounter: 64.9 kg (143 lb).     Weight management plan: Discussed healthy diet and exercise guidelines    He reports that he has never smoked. He has never used smokeless tobacco.      Counseling Resources:  ATP IV Guidelines  Pooled Cohorts Equation Calculator  FRAX Risk Assessment  ICSI Preventive Guidelines  Dietary Guidelines for Americans, 2010  USDA's MyPlate  ASA Prophylaxis  Lung CA Screening    BL[facility]  "

## 2020-10-24 ASSESSMENT — ANXIETY QUESTIONNAIRES: GAD7 TOTAL SCORE: 0

## 2021-02-11 ENCOUNTER — MYC MEDICAL ADVICE (OUTPATIENT)
Dept: FAMILY MEDICINE | Facility: CLINIC | Age: 26
End: 2021-02-11

## 2021-02-11 DIAGNOSIS — F42.9 OBSESSIVE-COMPULSIVE DISORDER, UNSPECIFIED TYPE: ICD-10-CM

## 2021-02-11 DIAGNOSIS — F33.0 MAJOR DEPRESSIVE DISORDER, RECURRENT EPISODE, MILD (H): ICD-10-CM

## 2021-02-12 RX ORDER — CITALOPRAM HYDROBROMIDE 20 MG/1
20 TABLET ORAL DAILY
Qty: 90 TABLET | Refills: 2 | Status: SHIPPED | OUTPATIENT
Start: 2021-02-12 | End: 2021-12-01

## 2021-02-12 RX ORDER — BUPROPION HYDROCHLORIDE 300 MG/1
300 TABLET ORAL EVERY MORNING
Qty: 90 TABLET | Refills: 2 | Status: SHIPPED | OUTPATIENT
Start: 2021-02-12 | End: 2021-12-01

## 2021-09-01 ENCOUNTER — MYC MEDICAL ADVICE (OUTPATIENT)
Dept: FAMILY MEDICINE | Facility: CLINIC | Age: 26
End: 2021-09-01

## 2021-09-26 ENCOUNTER — HEALTH MAINTENANCE LETTER (OUTPATIENT)
Age: 26
End: 2021-09-26

## 2021-12-01 DIAGNOSIS — F42.9 OBSESSIVE-COMPULSIVE DISORDER, UNSPECIFIED TYPE: ICD-10-CM

## 2021-12-01 DIAGNOSIS — F33.0 MAJOR DEPRESSIVE DISORDER, RECURRENT EPISODE, MILD (H): ICD-10-CM

## 2021-12-01 RX ORDER — CITALOPRAM HYDROBROMIDE 20 MG/1
TABLET ORAL
Qty: 90 TABLET | Refills: 0 | Status: SHIPPED | OUTPATIENT
Start: 2021-12-01 | End: 2021-12-28

## 2021-12-01 RX ORDER — BUPROPION HYDROCHLORIDE 300 MG/1
TABLET ORAL
Qty: 90 TABLET | Refills: 0 | Status: SHIPPED | OUTPATIENT
Start: 2021-12-01 | End: 2021-12-28

## 2021-12-01 NOTE — TELEPHONE ENCOUNTER
"Routing refill request to provider for review/approval because:  Failed protocol.  No future appointment scheduled. Please advise. Thank you. Conchis Elliott R.N.  Requested Prescriptions   Pending Prescriptions Disp Refills    citalopram (CELEXA) 20 MG tablet [Pharmacy Med Name: citalopram 20 mg tablet (CELEXA)] 90 tablet 2     Sig: Take 1 tablet by mouth once daily.        SSRIs Protocol Failed - 12/1/2021  9:49 AM        Failed - PHQ-9 score less than 5 in past 6 months     Please review last PHQ-9 score.           Failed - Recent (6 mo) or future (30 days) visit within the authorizing provider's specialty     Patient had office visit in the last 6 months or has a visit in the next 30 days with authorizing provider or within the authorizing provider's specialty.  See \"Patient Info\" tab in inbasket, or \"Choose Columns\" in Meds & Orders section of the refill encounter.            Passed - Medication is Bupropion     If the medication is Bupropion (Wellbutrin), and the patient is taking for smoking cessation; OK to refill.            Passed - Medication is active on med list        Passed - Patient is age 18 or older           buPROPion (WELLBUTRIN XL) 300 MG 24 hr tablet [Pharmacy Med Name: buPROPion HCL  mg 24 hr tablet, extended release (WELLBUTRIN XL)] 90 tablet 2     Sig: Take 1 tablet by mouth once daily in the morning        SSRIs Protocol Failed - 12/1/2021  9:49 AM        Failed - PHQ-9 score less than 5 in past 6 months     Please review last PHQ-9 score.           Failed - Recent (6 mo) or future (30 days) visit within the authorizing provider's specialty     Patient had office visit in the last 6 months or has a visit in the next 30 days with authorizing provider or within the authorizing provider's specialty.  See \"Patient Info\" tab in inbasket, or \"Choose Columns\" in Meds & Orders section of the refill encounter.            Passed - Medication is Bupropion     If the medication is Bupropion " (Wellbutrin), and the patient is taking for smoking cessation; OK to refill.            Passed - Medication is active on med list        Passed - Patient is age 18 or older

## 2021-12-01 NOTE — LETTER
December 3, 2021    Rickey Villalta  [id number] [place] [address]    Dear Rickey,       We recently received a refill request for citalopram (CELEXA) & buPROPion (WELLBUTRIN XL) .  We have refilled this for a one time 90 day supply only because you are due for a:    Depression office visit for further refills.      Please call at your earliest convenience so that there will not be a delay with your future refills.          Thank you,   Your [facility] Team/[facility]  [phone]

## 2021-12-28 ENCOUNTER — OFFICE VISIT (OUTPATIENT)
Dept: FAMILY MEDICINE | Facility: CLINIC | Age: 26
End: 2021-12-28
Payer: COMMERCIAL

## 2021-12-28 VITALS
BODY MASS INDEX: 22.88 KG/M2 | SYSTOLIC BLOOD PRESSURE: 126 MMHG | HEIGHT: 68 IN | HEART RATE: 71 BPM | RESPIRATION RATE: 16 BRPM | WEIGHT: 151 LBS | TEMPERATURE: 97.5 F | DIASTOLIC BLOOD PRESSURE: 66 MMHG | OXYGEN SATURATION: 96 %

## 2021-12-28 DIAGNOSIS — F33.0 MAJOR DEPRESSIVE DISORDER, RECURRENT EPISODE, MILD (H): ICD-10-CM

## 2021-12-28 DIAGNOSIS — Z00.00 ROUTINE GENERAL MEDICAL EXAMINATION AT A HEALTH CARE FACILITY: Primary | ICD-10-CM

## 2021-12-28 DIAGNOSIS — F42.9 OBSESSIVE-COMPULSIVE DISORDER, UNSPECIFIED TYPE: ICD-10-CM

## 2021-12-28 LAB
ALBUMIN SERPL-MCNC: 4.3 G/DL (ref 3.4–5)
ALP SERPL-CCNC: 75 U/L (ref 40–150)
ALT SERPL W P-5'-P-CCNC: 37 U/L (ref 0–70)
ANION GAP SERPL CALCULATED.3IONS-SCNC: 4 MMOL/L (ref 3–14)
AST SERPL W P-5'-P-CCNC: 30 U/L (ref 0–45)
BILIRUB SERPL-MCNC: 0.2 MG/DL (ref 0.2–1.3)
BUN SERPL-MCNC: 26 MG/DL (ref 7–30)
CALCIUM SERPL-MCNC: 9.1 MG/DL (ref 8.5–10.1)
CHLORIDE BLD-SCNC: 104 MMOL/L (ref 94–109)
CHOLEST SERPL-MCNC: 139 MG/DL
CO2 SERPL-SCNC: 29 MMOL/L (ref 20–32)
CREAT SERPL-MCNC: 1.27 MG/DL (ref 0.66–1.25)
FASTING STATUS PATIENT QL REPORTED: NO
GFR SERPL CREATININE-BSD FRML MDRD: 79 ML/MIN/1.73M2
GLUCOSE BLD-MCNC: 85 MG/DL (ref 70–99)
HDLC SERPL-MCNC: 50 MG/DL
LDLC SERPL CALC-MCNC: 72 MG/DL
NONHDLC SERPL-MCNC: 89 MG/DL
POTASSIUM BLD-SCNC: 3.7 MMOL/L (ref 3.4–5.3)
PROT SERPL-MCNC: 7.6 G/DL (ref 6.8–8.8)
SODIUM SERPL-SCNC: 137 MMOL/L (ref 133–144)
TRIGL SERPL-MCNC: 85 MG/DL

## 2021-12-28 PROCEDURE — 36415 COLL VENOUS BLD VENIPUNCTURE: CPT | Performed by: PHYSICIAN ASSISTANT

## 2021-12-28 PROCEDURE — 99214 OFFICE O/P EST MOD 30 MIN: CPT | Mod: 25 | Performed by: PHYSICIAN ASSISTANT

## 2021-12-28 PROCEDURE — 86803 HEPATITIS C AB TEST: CPT | Performed by: PHYSICIAN ASSISTANT

## 2021-12-28 PROCEDURE — 80061 LIPID PANEL: CPT | Performed by: PHYSICIAN ASSISTANT

## 2021-12-28 PROCEDURE — 80053 COMPREHEN METABOLIC PANEL: CPT | Performed by: PHYSICIAN ASSISTANT

## 2021-12-28 PROCEDURE — 99395 PREV VISIT EST AGE 18-39: CPT | Performed by: PHYSICIAN ASSISTANT

## 2021-12-28 RX ORDER — CITALOPRAM HYDROBROMIDE 20 MG/1
20 TABLET ORAL DAILY
Qty: 90 TABLET | Refills: 1 | Status: SHIPPED | OUTPATIENT
Start: 2021-12-28 | End: 2022-06-23

## 2021-12-28 RX ORDER — BUPROPION HYDROCHLORIDE 300 MG/1
TABLET ORAL
Qty: 90 TABLET | Refills: 1 | Status: SHIPPED | OUTPATIENT
Start: 2021-12-28 | End: 2022-06-23

## 2021-12-28 ASSESSMENT — ENCOUNTER SYMPTOMS
ABDOMINAL PAIN: 0
COUGH: 0
SHORTNESS OF BREATH: 0
HEADACHES: 0
SORE THROAT: 0
HEMATURIA: 0
MYALGIAS: 0
FEVER: 0
JOINT SWELLING: 0
DIZZINESS: 0
CHILLS: 0
NAUSEA: 0
ARTHRALGIAS: 0
PALPITATIONS: 0
NERVOUS/ANXIOUS: 0
WEAKNESS: 0
DYSURIA: 0
HEARTBURN: 0
PARESTHESIAS: 0
CONSTIPATION: 0
DIARRHEA: 0
HEMATOCHEZIA: 0
FREQUENCY: 0
EYE PAIN: 0

## 2021-12-28 ASSESSMENT — MIFFLIN-ST. JEOR: SCORE: 1633.4

## 2021-12-28 ASSESSMENT — PAIN SCALES - GENERAL: PAINLEVEL: NO PAIN (0)

## 2021-12-28 NOTE — PROGRESS NOTES
SUBJECTIVE:   CC: Rickey Villalta is an 26 year old male who presents for preventative health visit.       Patient has been advised of split billing requirements and indicates understanding: Yes  Healthy Habits:     Getting at least 3 servings of Calcium per day:  Yes    Bi-annual eye exam:  NO    Dental care twice a year:  Yes    Sleep apnea or symptoms of sleep apnea:  None    Diet:  Regular (no restrictions)    Frequency of exercise:  2-3 days/week    Duration of exercise:  30-45 minutes    Taking medications regularly:  Yes    Medication side effects:  Not applicable    PHQ-2 Total Score: 0    Additional concerns today:  No    Patient has a history of OCD and depression.  He is stable on his medications and has no concerns.  He takes Wellbutrin and citalopram.  He has been on these for years.  He has no other questions or concerns.    Today's PHQ-2 Score:   PHQ-2 ( 1999 Pfizer) 12/28/2021   Q1: Little interest or pleasure in doing things 0   Q2: Feeling down, depressed or hopeless 0   PHQ-2 Score 0   PHQ-2 Total Score (12-17 Years)- Positive if 3 or more points; Administer PHQ-A if positive -   Q1: Little interest or pleasure in doing things Not at all   Q2: Feeling down, depressed or hopeless Not at all   PHQ-2 Score 0     Abuse: Current or Past(Physical, Sexual or Emotional)- No  Do you feel safe in your environment? Yes    Have you ever done Advance Care Planning? (For example, a Health Directive, POLST, or a discussion with a medical provider or your loved ones about your wishes): No, advance care planning information given to patient to review.  Advanced care planning was discussed at today's visit.    Social History     Tobacco Use     Smoking status: Never Smoker     Smokeless tobacco: Never Used   Substance Use Topics     Alcohol use: Yes     Comment: infrequent social use         Alcohol Use 12/28/2021   Prescreen: >3 drinks/day or >7 drinks/week? No   Prescreen: >3 drinks/day or >7 drinks/week? -      Last PSA: No results found for: PSA    Reviewed orders with patient. Reviewed health maintenance and updated orders accordingly - Yes  Lab work is in process  Labs reviewed in EPIC  BP Readings from Last 3 Encounters:   21 126/66   10/23/20 110/70   19 112/67    Wt Readings from Last 3 Encounters:   21 68.5 kg (151 lb)   10/23/20 64.9 kg (143 lb)   19 65.8 kg (145 lb)               Patient Active Problem List   Diagnosis     OCD (obsessive compulsive disorder)     Heart murmur     Infected insect bite of right leg     Impacted cerumen     Seasonal allergic conjunctivitis     Seasonal allergic rhinitis     Diagnostic skin and sensitization tests     FHx: cardiovascular disease     Common wart     Major depressive disorder, recurrent episode, mild (H)     Tinea versicolor     Scrotal cyst     Past Surgical History:   Procedure Laterality Date     EYE SURGERY      Lasik     HC PERC SKELETAL FIX UNSTABLE PHAL SHAFT FX W MANIP, EACH  10/19/2010    perc pinning Rt index prox phalanx fx     HERNIA REPAIR       ZZC STOMACH SURGERY PROCEDURE UNLISTED       [facility] OPEN TX RADIAL & ULNAR SHAFT FX FIX RADIUS & ULNA         Social History     Tobacco Use     Smoking status: Never Smoker     Smokeless tobacco: Never Used   Substance Use Topics     Alcohol use: Yes     Comment: infrequent social use     Family History   Problem Relation Age of Onset     Coronary Artery Disease Father      Hypertension Father      Myocardial Infarction Father 47             Diabetes Paternal Grandfather      Cerebrovascular Disease Paternal Grandfather      Heart Disease Paternal Uncle 56     Macular Degeneration No family hx of      Glaucoma No family hx of      Thyroid Disease No family hx of      Cancer No family hx of          Current Outpatient Medications   Medication Sig Dispense Refill     buPROPion (WELLBUTRIN XL) 300 MG 24 hr tablet Take 1 tablet by mouth once daily in the morning 90 tablet 1      citalopram (CELEXA) 20 MG tablet Take 1 tablet (20 mg) by mouth daily 90 tablet 1     Misc Natural Products (GLUCOSAMINE CHONDROITIN ADV PO)        cetirizine (ZYRTEC) 10 MG tablet Take 1 tablet (10 mg) by mouth every evening (Patient not taking: Reported on 12/28/2021) 30 tablet 11     Allergies   Allergen Reactions     Nkda [No Known Drug Allergies]        Reviewed and updated as needed this visit by clinical staff  Tobacco  Allergies  Meds  Problems  Med Hx  Surg Hx  Fam Hx  Soc Hx         Reviewed and updated as needed this visit by Provider  Tobacco  Allergies  Meds  Problems  Med Hx  Surg Hx  Fam Hx          Past Medical History:   Diagnosis Date     Depressive disorder      Diagnostic skin and sensitization tests 5/12/14 IgE tests pos. only for: tree pollens.      Heart murmur 2011    Heart murmur noted     Mild major depression (H) 5/1/2012     OCD (obsessive compulsive disorder)      Oral allergy syndrome     raw carrots, celery, apples, peach--NOT a true food allergy and NO Epipen needed.     Seasonal allergic conjunctivitis     5/12/14 IgE tests pos. only for: tree pollens.      Seasonal allergic rhinitis       Past Surgical History:   Procedure Laterality Date     EYE SURGERY  2014    Lasik     HC PERC SKELETAL FIX UNSTABLE PHAL SHAFT FX W MANIP, EACH  10/19/2010    perc pinning Rt index prox phalanx fx     HERNIA REPAIR  1996     [facility] STOMACH SURGERY PROCEDURE UNLISTED       [email or web address] OPEN TX RADIAL & ULNAR SHAFT FX FIX RADIUS & ULNA         Review of Systems   Constitutional: Negative for chills and fever.   HENT: Negative for congestion, ear pain, hearing loss and sore throat.    Eyes: Negative for pain and visual disturbance.   Respiratory: Negative for cough and shortness of breath.    Cardiovascular: Negative for chest pain, palpitations and peripheral edema.   Gastrointestinal: Negative for abdominal pain, constipation, diarrhea, heartburn, hematochezia and nausea.   Genitourinary:  "Negative for dysuria, frequency, genital sores, hematuria, impotence, penile discharge and urgency.   Musculoskeletal: Negative for arthralgias, joint swelling and myalgias.   Skin: Negative for rash.   Neurological: Negative for dizziness, weakness, headaches and paresthesias.   Psychiatric/Behavioral: Negative for mood changes. The patient is not nervous/anxious.          OBJECTIVE:   /66   Pulse 71   Temp 97.5  F (36.4  C) (Tympanic)   Resp 16   Ht 1.734 m (5' 8.25\")   Wt 68.5 kg (151 lb)   SpO2 96%   BMI 22.79 kg/m      Physical Exam  GENERAL: healthy, alert and no distress  EYES: Eyes grossly normal to inspection, PERRL and conjunctivae and sclerae normal  HENT: ear canals and TM's normal, nose and mouth without ulcers or lesions  NECK: no adenopathy, no asymmetry, masses, or scars and thyroid normal to palpation  RESP: lungs clear to auscultation - no rales, rhonchi or wheezes  CV: regular rate and rhythm, normal S1 S2, no S3 or S4, no murmur, click or rub, no peripheral edema and peripheral pulses strong  ABDOMEN: soft, nontender, no hepatosplenomegaly, no masses and bowel sounds normal   (male): normal male genitalia without lesions or urethral discharge, no hernia  MS: no gross musculoskeletal defects noted, no edema  SKIN: no suspicious lesions or rashes  NEURO: Normal strength and tone, mentation intact and speech normal  PSYCH: mentation appears normal, affect normal/bright    Diagnostic Test Results:  Labs reviewed in Epic    ASSESSMENT/PLAN:       ICD-10-CM    1. Routine general medical examination at a health care facility  Z00.00 Lipid panel reflex to direct LDL Fasting     Comprehensive metabolic panel (BMP + Alb, Alk Phos, ALT, AST, Total. Bili, TP)     Hepatitis C Screen Reflex to HCV RNA Quant and Genotype   2. Obsessive-compulsive disorder, unspecified type  F42.9 buPROPion (WELLBUTRIN XL) 300 MG 24 hr tablet     citalopram (CELEXA) 20 MG tablet     OFFICE/OUTPT VISIT,EST,LEVL III " "  3. Major depressive disorder, recurrent episode, mild (H)  F33.0 buPROPion (WELLBUTRIN XL) 300 MG 24 hr tablet     citalopram (CELEXA) 20 MG tablet     OFFICE/OUTPT VISIT,SUMIT DAVILA III   1. Work on Healthy diet and exercise. Getting heart rate elevated for 30 mins most days of week.  2-3: medical conditions are stable. meds refilled.      Patient has been advised of split billing requirements and indicates understanding: Yes  COUNSELING:   Reviewed preventive health counseling, as reflected in patient instructions       Regular exercise       Healthy diet/nutrition       Vision screening    Estimated body mass index is 22.79 kg/m  as calculated from the following:    Height as of this encounter: 1.734 m (5' 8.25\").    Weight as of this encounter: 68.5 kg (151 lb).         He reports that he has never smoked. He has never used smokeless tobacco.      Counseling Resources:  ATP IV Guidelines  Pooled Cohorts Equation Calculator  FRAX Risk Assessment  ICSI Preventive Guidelines  Dietary Guidelines for Americans, 2010  USDA's MyPlate  ASA Prophylaxis  Lung CA Screening    BLACK Masters [facility]  "

## 2021-12-29 LAB — HCV AB SERPL QL IA: NONREACTIVE

## 2021-12-29 NOTE — RESULT ENCOUNTER NOTE
Mr. Villalta,    All of your labs were normal/near normal for you.    Please contact the clinic if you have additional questions.  Thank you.    Sincerely,    Mik Eng PA-C

## 2022-06-22 NOTE — PROGRESS NOTES
Assessment & Plan       ICD-10-CM    1. Neck pain  M54.2 MR Cervical Spine w/o Contrast   2. Obsessive-compulsive disorder, unspecified type  F42.9 buPROPion (WELLBUTRIN XL) 300 MG 24 hr tablet     citalopram (CELEXA) 20 MG tablet   3. Major depressive disorder, recurrent episode, mild (H)  F33.0 buPROPion (WELLBUTRIN XL) 300 MG 24 hr tablet     citalopram (CELEXA) 20 MG tablet   1.  Unclear etiology at this time.  He has had a negative x-ray and minimal success with physical therapy.  He continues to have pain with range of motion we will go ahead and get an MRI.  Follow-up with depend on those results.  2-3 medical condition stable okay for refills recheck 6 months      Return in about 4 weeks (around 7/21/2022) for recheck.    BLACK Masters [facility] JEWELL Lang is a 26 year old accompanied by his self., presenting for the following health issues:  Pain      History of Present Illness       Reason for visit:  Ongoin neck pain x3 months  Symptom onset:  More than a month  Symptoms include:  Left sided neck pain with specific movements  Symptom intensity:  Moderate  Symptom progression:  Staying the same  Had these symptoms before:  No  What makes it worse:  Sleep position specific movements  What makes it better:  Nsaids but I don t want to use frequently    He eats 2-3 servings of fruits and vegetables daily.He consumes 0 sweetened beverage(s) daily.He exercises with enough effort to increase his heart rate 20 to 29 minutes per day.  He exercises with enough effort to increase his heart rate 3 or less days per week. He is missing 1 dose(s) of medications per week.    Today's PHQ-9         PHQ-9 Total Score: 0    PHQ-9 Q9 Thoughts of better off dead/self-harm past 2 weeks :   Not at all    How difficult have these problems made it for you to do your work, take care of things at home, or get along with other people: Not difficult at all   was seen in Urgent care on  "3/14 and 3/23. Neg x-ray and prednisone helped.   Tried PHYSICAL THERAPY helped with range of motion but pain continues.   No injuries. Just work up with pain.   No numbness/tingling   No previous problems.   1-2/10 at rest up to 5/10 with range of motion.     Depression Followup    How are you doing with your depression since your last visit? Stable, doing well    Are you having other symptoms that might be associated with depression? No    Have you had a significant life event?  No     Are you feeling anxious or having panic attacks?   No    Do you have any concerns with your use of alcohol or other drugs? No    Social History     Tobacco Use     Smoking status: Never Smoker     Smokeless tobacco: Never Used   Vaping Use     Vaping Use: Never used   Substance Use Topics     Alcohol use: Yes     Comment: infrequent social use     Drug use: No     PHQ 12/30/2019 10/23/2020 6/23/2022   PHQ-9 Total Score 0 0 0   Q9: Thoughts of better off dead/self-harm past 2 weeks Not at all Not at all Not at all     DALI-7 SCORE 4/24/2018 6/27/2018 10/23/2020   Total Score - - -   Total Score - 0 (minimal anxiety) -   Total Score 15 0 0       Review of Systems   Constitutional, HEENT, cardiovascular, pulmonary, gi and gu systems are negative, except as otherwise noted.      Objective    /72   Pulse 65   Temp (!) 96  F (35.6  C) (Tympanic)   Resp 14   Ht 1.734 m (5' 8.25\")   Wt 66.7 kg (147 lb)   SpO2 98%   BMI 22.19 kg/m    Body mass index is 22.19 kg/m .  Physical Exam   GENERAL: healthy, alert and no distress  EYES: Eyes grossly normal to inspection, PERRL and conjunctivae and sclerae normal  HENT: ear canals and TM's normal, nose and mouth without ulcers or lesions  NECK: no adenopathy, no asymmetry, masses, or scars and thyroid normal to palpation  RESP: lungs clear to auscultation - no rales, rhonchi or wheezes  CV: regular rate and rhythm, normal S1 S2, no S3 or S4, no murmur, click or rub, no peripheral edema and " peripheral pulses strong  MS: no gross musculoskeletal defects noted, no edema  Cervical: good posturing. Pain with spurlings. No tenderness. No radiculopathy.   Full range of motion bilateral upper extremities with 5 out of 5 strength.  Negative Tinel sign.  5 out of 5  strength

## 2022-06-23 ENCOUNTER — OFFICE VISIT (OUTPATIENT)
Dept: FAMILY MEDICINE | Facility: CLINIC | Age: 27
End: 2022-06-23
Payer: COMMERCIAL

## 2022-06-23 VITALS
SYSTOLIC BLOOD PRESSURE: 116 MMHG | DIASTOLIC BLOOD PRESSURE: 72 MMHG | OXYGEN SATURATION: 98 % | TEMPERATURE: 96 F | RESPIRATION RATE: 14 BRPM | HEART RATE: 65 BPM | WEIGHT: 147 LBS | HEIGHT: 68 IN | BODY MASS INDEX: 22.28 KG/M2

## 2022-06-23 DIAGNOSIS — M54.2 NECK PAIN: Primary | ICD-10-CM

## 2022-06-23 DIAGNOSIS — F42.9 OBSESSIVE-COMPULSIVE DISORDER, UNSPECIFIED TYPE: ICD-10-CM

## 2022-06-23 DIAGNOSIS — F33.0 MAJOR DEPRESSIVE DISORDER, RECURRENT EPISODE, MILD (H): ICD-10-CM

## 2022-06-23 PROCEDURE — 90715 TDAP VACCINE 7 YRS/> IM: CPT | Performed by: PHYSICIAN ASSISTANT

## 2022-06-23 PROCEDURE — 99214 OFFICE O/P EST MOD 30 MIN: CPT | Mod: 25 | Performed by: PHYSICIAN ASSISTANT

## 2022-06-23 PROCEDURE — 90471 IMMUNIZATION ADMIN: CPT | Performed by: PHYSICIAN ASSISTANT

## 2022-06-23 RX ORDER — BUPROPION HYDROCHLORIDE 300 MG/1
TABLET ORAL
Qty: 90 TABLET | Refills: 1 | Status: SHIPPED | OUTPATIENT
Start: 2022-06-23 | End: 2023-01-20

## 2022-06-23 RX ORDER — CITALOPRAM HYDROBROMIDE 20 MG/1
20 TABLET ORAL DAILY
Qty: 90 TABLET | Refills: 1 | Status: SHIPPED | OUTPATIENT
Start: 2022-06-23 | End: 2023-01-20

## 2022-06-23 ASSESSMENT — PAIN SCALES - GENERAL: PAINLEVEL: MILD PAIN (2)

## 2022-07-01 ENCOUNTER — HOSPITAL ENCOUNTER (OUTPATIENT)
Dept: MRI IMAGING | Facility: CLINIC | Age: 27
Discharge: HOME | End: 2022-07-01
Attending: PHYSICIAN ASSISTANT | Admitting: PHYSICIAN ASSISTANT
Payer: COMMERCIAL

## 2022-07-01 DIAGNOSIS — M54.2 NECK PAIN: ICD-10-CM

## 2022-07-01 PROCEDURE — 72141 MRI NECK SPINE W/O DYE: CPT

## 2022-08-01 ENCOUNTER — OFFICE VISIT (OUTPATIENT)
Dept: NEUROSURGERY | Facility: CLINIC | Age: 27
End: 2022-08-01
Attending: PHYSICIAN ASSISTANT
Payer: COMMERCIAL

## 2022-08-01 VITALS
BODY MASS INDEX: 21.79 KG/M2 | SYSTOLIC BLOOD PRESSURE: 124 MMHG | WEIGHT: 143.8 LBS | DIASTOLIC BLOOD PRESSURE: 72 MMHG | HEIGHT: 68 IN

## 2022-08-01 DIAGNOSIS — M54.2 NECK PAIN: ICD-10-CM

## 2022-08-01 PROCEDURE — 99203 OFFICE O/P NEW LOW 30 MIN: CPT | Performed by: PHYSICIAN ASSISTANT

## 2022-08-01 RX ORDER — METHYLPREDNISOLONE 4 MG
TABLET, DOSE PACK ORAL
Qty: 21 TABLET | Refills: 0 | Status: SHIPPED | OUTPATIENT
Start: 2022-08-01

## 2022-08-01 ASSESSMENT — PAIN SCALES - GENERAL: PAINLEVEL: MILD PAIN (2)

## 2022-08-01 NOTE — LETTER
8/1/2022         RE: Rickey Villalta  [address] [place] [place] [facility] [address]        Dear Colleague,    Thank you for referring your patient, Rickey Villalta, to the [facility] [place]. Please see a copy of my visit note below.    Dr. Robbin Garcia  [facility]  Neurosurgery Clinic Visit      CC: neck pain    Primary care Provider: Mik Eng    Referring Provider:  Mik Eng, BLACK      Reason For Visit:   I was asked to consult on the patient for neck pain.      HPI: Rickey Villalta is a 26 year old male who presents for evaluation of his chief complaint of neck pain over the last several months.  There was no event or injury that he can recall.  He denies any radiating pain or paresthesias.  He was treated initially with a Medrol Dosepak and physical therapy.  The steroid was very helpful.  The physical therapy seem to exacerbate his symptoms.  No bowel or bladder changes, or any problems with balance or coordination.    Past Medical History:   Diagnosis Date     Depressive disorder      Diagnostic skin and sensitization tests 5/12/14 IgE tests pos. only for: tree pollens.      Heart murmur 2011    Heart murmur noted     Mild major depression (H) 5/1/2012     OCD (obsessive compulsive disorder)      Oral allergy syndrome     raw carrots, celery, apples, peach--NOT a true food allergy and NO Epipen needed.     Seasonal allergic conjunctivitis     5/12/14 IgE tests pos. only for: tree pollens.      Seasonal allergic rhinitis        Past Medical History reviewed with patient during visit.    Past Surgical History:   Procedure Laterality Date     EYE SURGERY  2014    Lasik     HC PERC SKELETAL FIX UNSTABLE PHAL SHAFT FX W MANIP, EACH  10/19/2010    perc pinning Rt index prox phalanx fx     HERNIA REPAIR  1996     [facility] STOMACH SURGERY PROCEDURE UNLISTED       [facility] SHAFT FX FIX RADIUS & ULNA       Past Surgical History reviewed with  "patient during visit.    Current Outpatient Medications   Medication     buPROPion (WELLBUTRIN XL) 300 MG 24 hr tablet     cetirizine (ZYRTEC) 10 MG tablet     citalopram (CELEXA) 20 MG tablet     methylPREDNISolone (MEDROL DOSEPAK) 4 MG tablet therapy pack     Misc Natural Products (GLUCOSAMINE CHONDROITIN ADV PO)     No current facility-administered medications for this visit.       Allergies   Allergen Reactions     Nkda [No Known Drug Allergies]        Social History     Socioeconomic History     Marital status:      Spouse name: None     Number of children: None     Years of education: None     Highest education level: None   Tobacco Use     Smoking status: Never Smoker     Smokeless tobacco: Never Used   Vaping Use     Vaping Use: Never used   Substance and Sexual Activity     Alcohol use: Yes     Comment: infrequent social use     Drug use: No     Sexual activity: Yes     Partners: Female     Birth control/protection: None   Other Topics Concern     Parent/sibling w/ CABG, MI or angioplasty before 65F 55M? Yes     Comment: Dad  2014 of heart attack at age 47       Family History   Problem Relation Age of Onset     Coronary Artery Disease Father      Hypertension Father      Myocardial Infarction Father 47             Diabetes Paternal Grandfather      Cerebrovascular Disease Paternal Grandfather      Heart Disease Paternal Uncle 56     Macular Degeneration No family hx of      Glaucoma No family hx of      Thyroid Disease No family hx of      Cancer No family hx of           ROS: 10 point ROS neg other than the symptoms noted above in the HPI.    Vital Signs: /72   Ht 1.734 m (5' 8.25\")   Wt 65.2 kg (143 lb 12.8 oz)   BMI 21.70 kg/m      Examination:  Constitutional:  Alert, well nourished, NAD.  HEENT: Normocephalic, atraumatic.   Pulmonary:  Without shortness of breath, normal effort.   Lymph: no lymphadenopathy to low back or LE.   Integumentary: Skin is free of rashes or " lesions.   Cardiovascular:  No pitting edema of BLE.    Psych: Normal affect, no apparent distress    Neurological:  Awake  Alert  Oriented x 3  Speech clear  Cranial nerves II - XII grossly intact  Motor exam   Shoulder Abduction:  Right:  5/5   Left:  5/5  Biceps:                      Right:  5/5   Left:  5/5  Triceps:                     Right:  5/5   Left:  5/5  Wrist Extensors:       Right:  5/5   Left:  5/5  Wrist Flexors:           Right:  5/5   Left:  5/5  Intrinsics:                   Right:  5/5   Left:  5/5  Sensation normal to bilateral upper and lower extremities.    Reflexes are 2+ in the brachial radialis and triceps. Negative Anny sign bilaterally.    Musculoskeletal:  Gait: Able to stand from a seated position. Normal non-antalgic, non-myelopathic gait.    Cervical examination reveals good range of motion.  No tenderness to palpation of the cervical spine or paraspinous muscles bilaterally.    Imaging:   MRI of the cervical spine was reviewed in the office today.  There are no disc herniations or nerve compression.    Assessment/Plan:     Cervicalgia    Rickey Villalta is a 26 year old male.  I did have a discussion with the patient regarding his symptoms.  We also reviewed his MRI in detail.  His symptoms have improved, but he is concerned about what he can do in the future.  We discussed maintaining overall spine health.  He can certainly try the physical therapy again if desired.  He understands there are no surgical interventions indicated.  I did send a Medrol Dosepak over to his pharmacy, to have on hand if he gets a flare in the future.  He voiced agreement and understanding.  We will follow-up with him for any new or worsening symptoms.          London Iyer PA-C  [facility] Neurosurgery  [address] [address]    Tel [phone]  Pager [phone]      The use of Dragon/AirKast dictation services may have been used to  "construct the content in this note; any grammatical or spelling errors are non-intentional. Please contact the author of this note directly if you are in need of any clarification.      Rickey Villalta is a 26 year old male who presents for:  Chief Complaint   Patient presents with     Neurologic Problem     Cervical pain         Initial Vitals:  /72   Ht 5' 8.25\" (1.734 m)   Wt 143 lb 12.8 oz (65.2 kg)   BMI 21.70 kg/m   Estimated body mass index is 21.7 kg/m  as calculated from the following:    Height as of this encounter: 5' 8.25\" (1.734 m).    Weight as of this encounter: 143 lb 12.8 oz (65.2 kg).. Body surface area is 1.77 meters squared. BP completed using cuff size: regular  Mild Pain (2)    Nursing Comments: Cervical pain     Anitha Garcia        Again, thank you for allowing me to participate in the care of your patient.        Sincerely,        London Iyer PA-C    "

## 2022-08-01 NOTE — PROGRESS NOTES
Dr. Robbin Garcia  [facility]  Neurosurgery Clinic Visit      CC: neck pain    Primary care Provider: Mik Eng    Referring Provider:  Mik Eng PA-C      Reason For Visit:   I was asked to consult on the patient for neck pain.      HPI: Rickey Villalta is a 26 year old male who presents for evaluation of his chief complaint of neck pain over the last several months.  There was no event or injury that he can recall.  He denies any radiating pain or paresthesias.  He was treated initially with a Medrol Dosepak and physical therapy.  The steroid was very helpful.  The physical therapy seem to exacerbate his symptoms.  No bowel or bladder changes, or any problems with balance or coordination.    Past Medical History:   Diagnosis Date     Depressive disorder      Diagnostic skin and sensitization tests 5/12/14 IgE tests pos. only for: tree pollens.      Heart murmur 2011    Heart murmur noted     Mild major depression (H) 5/1/2012     OCD (obsessive compulsive disorder)      Oral allergy syndrome     raw carrots, celery, apples, peach--NOT a true food allergy and NO Epipen needed.     Seasonal allergic conjunctivitis     5/12/14 IgE tests pos. only for: tree pollens.      Seasonal allergic rhinitis        Past Medical History reviewed with patient during visit.    Past Surgical History:   Procedure Laterality Date     EYE SURGERY  2014    Lasik     HC PERC SKELETAL FIX UNSTABLE PHAL SHAFT FX W MANIP, EACH  10/19/2010    perc pinning Rt index prox phalanx fx     HERNIA REPAIR  1996     [facility] STOMACH SURGERY PROCEDURE UNLISTED       [facility] OPEN TX RADIAL & ULNAR SHAFT FX FIX RADIUS & ULNA       Past Surgical History reviewed with patient during visit.    Current Outpatient Medications   Medication     buPROPion (WELLBUTRIN XL) 300 MG 24 hr tablet     cetirizine (ZYRTEC) 10 MG tablet     citalopram (CELEXA) 20 MG tablet     methylPREDNISolone (MEDROL DOSEPAK) 4 MG tablet therapy pack  "    Misc Natural Products (GLUCOSAMINE CHONDROITIN ADV PO)     No current facility-administered medications for this visit.       Allergies   Allergen Reactions     Nkda [No Known Drug Allergies]        Social History     Socioeconomic History     Marital status:      Spouse name: None     Number of children: None     Years of education: None     Highest education level: None   Tobacco Use     Smoking status: Never Smoker     Smokeless tobacco: Never Used   Vaping Use     Vaping Use: Never used   Substance and Sexual Activity     Alcohol use: Yes     Comment: infrequent social use     Drug use: No     Sexual activity: Yes     Partners: Female     Birth control/protection: None   Other Topics Concern     Parent/sibling w/ CABG, MI or angioplasty before 65F 55M? Yes     Comment: Dad  2014 of heart attack at age 47       Family History   Problem Relation Age of Onset     Coronary Artery Disease Father      Hypertension Father      Myocardial Infarction Father 47             Diabetes Paternal Grandfather      Cerebrovascular Disease Paternal Grandfather      Heart Disease Paternal Uncle 56     Macular Degeneration No family hx of      Glaucoma No family hx of      Thyroid Disease No family hx of      Cancer No family hx of           ROS: 10 point ROS neg other than the symptoms noted above in the HPI.    Vital Signs: /72   Ht 1.734 m (5' 8.25\")   Wt 65.2 kg (143 lb 12.8 oz)   BMI 21.70 kg/m      Examination:  Constitutional:  Alert, well nourished, NAD.  HEENT: Normocephalic, atraumatic.   Pulmonary:  Without shortness of breath, normal effort.   Lymph: no lymphadenopathy to low back or LE.   Integumentary: Skin is free of rashes or lesions.   Cardiovascular:  No pitting edema of BLE.    Psych: Normal affect, no apparent distress    Neurological:  Awake  Alert  Oriented x 3  Speech clear  Cranial nerves II - XII grossly intact  Motor exam   Shoulder Abduction:  Right:  5/5   Left:  " 5/5  Biceps:                      Right:  5/5   Left:  5/5  Triceps:                     Right:  5/5   Left:  5/5  Wrist Extensors:       Right:  5/5   Left:  5/5  Wrist Flexors:           Right:  5/5   Left:  5/5  Intrinsics:                   Right:  5/5   Left:  5/5  Sensation normal to bilateral upper and lower extremities.    Reflexes are 2+ in the brachial radialis and triceps. Negative Anny sign bilaterally.    Musculoskeletal:  Gait: Able to stand from a seated position. Normal non-antalgic, non-myelopathic gait.    Cervical examination reveals good range of motion.  No tenderness to palpation of the cervical spine or paraspinous muscles bilaterally.    Imaging:   MRI of the cervical spine was reviewed in the office today.  There are no disc herniations or nerve compression.    Assessment/Plan:     Cervicalgia    Rickey Villalta is a 26 year old male.  I did have a discussion with the patient regarding his symptoms.  We also reviewed his MRI in detail.  His symptoms have improved, but he is concerned about what he can do in the future.  We discussed maintaining overall spine health.  He can certainly try the physical therapy again if desired.  He understands there are no surgical interventions indicated.  I did send a Medrol Dosepak over to his pharmacy, to have on hand if he gets a flare in the future.  He voiced agreement and understanding.  We will follow-up with him for any new or worsening symptoms.          London Iyer PA-C  [facility] Neurosurgery  [address] [address]    Tel [phone]  Pager [phone]      The use of Dragon/[email or web address] dictation services may have been used to construct the content in this note; any grammatical or spelling errors are non-intentional. Please contact the author of this note directly if you are in need of any clarification.

## 2022-08-01 NOTE — PROGRESS NOTES
"Rickey Villalta is a 26 year old male who presents for:  Chief Complaint   Patient presents with     Neurologic Problem     Cervical pain         Initial Vitals:  /72   Ht 5' 8.25\" (1.734 m)   Wt 143 lb 12.8 oz (65.2 kg)   BMI 21.70 kg/m   Estimated body mass index is 21.7 kg/m  as calculated from the following:    Height as of this encounter: 5' 8.25\" (1.734 m).    Weight as of this encounter: 143 lb 12.8 oz (65.2 kg).. Body surface area is 1.77 meters squared. BP completed using cuff size: regular  Mild Pain (2)    Nursing Comments: Cervical pain     Anitha Garcia    "

## 2023-01-20 ENCOUNTER — OFFICE VISIT (OUTPATIENT)
Dept: FAMILY MEDICINE | Facility: CLINIC | Age: 28
End: 2023-01-20
Payer: COMMERCIAL

## 2023-01-20 VITALS
DIASTOLIC BLOOD PRESSURE: 71 MMHG | BODY MASS INDEX: 22.43 KG/M2 | WEIGHT: 148 LBS | HEART RATE: 61 BPM | OXYGEN SATURATION: 98 % | RESPIRATION RATE: 14 BRPM | SYSTOLIC BLOOD PRESSURE: 106 MMHG | HEIGHT: 68 IN | TEMPERATURE: 97.4 F

## 2023-01-20 DIAGNOSIS — Z00.00 ENCOUNTER FOR PREVENTIVE CARE: Primary | ICD-10-CM

## 2023-01-20 DIAGNOSIS — F42.9 OBSESSIVE-COMPULSIVE DISORDER, UNSPECIFIED TYPE: ICD-10-CM

## 2023-01-20 DIAGNOSIS — F33.0 MAJOR DEPRESSIVE DISORDER, RECURRENT EPISODE, MILD (H): ICD-10-CM

## 2023-01-20 PROCEDURE — 99395 PREV VISIT EST AGE 18-39: CPT | Performed by: PHYSICIAN ASSISTANT

## 2023-01-20 RX ORDER — CITALOPRAM HYDROBROMIDE 20 MG/1
20 TABLET ORAL DAILY
Qty: 90 TABLET | Refills: 3 | Status: SHIPPED | OUTPATIENT
Start: 2023-01-20 | End: 2024-02-29

## 2023-01-20 RX ORDER — BUPROPION HYDROCHLORIDE 300 MG/1
TABLET ORAL
Qty: 90 TABLET | Refills: 3 | Status: SHIPPED | OUTPATIENT
Start: 2023-01-20 | End: 2024-02-29

## 2023-01-20 ASSESSMENT — ENCOUNTER SYMPTOMS
DIZZINESS: 0
HEARTBURN: 0
HEADACHES: 0
CHILLS: 0
ARTHRALGIAS: 1
HEMATOCHEZIA: 0
FEVER: 0
SHORTNESS OF BREATH: 0
CONSTIPATION: 0
JOINT SWELLING: 0
EYE PAIN: 0
COUGH: 0
NAUSEA: 0
NERVOUS/ANXIOUS: 0
FREQUENCY: 0
DIARRHEA: 0
PALPITATIONS: 0
MYALGIAS: 1
PARESTHESIAS: 0
ABDOMINAL PAIN: 0
HEMATURIA: 0
WEAKNESS: 0
SORE THROAT: 0
DYSURIA: 0

## 2023-01-20 ASSESSMENT — PATIENT HEALTH QUESTIONNAIRE - PHQ9
10. IF YOU CHECKED OFF ANY PROBLEMS, HOW DIFFICULT HAVE THESE PROBLEMS MADE IT FOR YOU TO DO YOUR WORK, TAKE CARE OF THINGS AT HOME, OR GET ALONG WITH OTHER PEOPLE: NOT DIFFICULT AT ALL
SUM OF ALL RESPONSES TO PHQ QUESTIONS 1-9: 1
SUM OF ALL RESPONSES TO PHQ QUESTIONS 1-9: 1

## 2023-01-20 ASSESSMENT — PAIN SCALES - GENERAL: PAINLEVEL: NO PAIN (0)

## 2023-01-20 NOTE — PROGRESS NOTES
SUBJECTIVE:   CC: Rickey is an 27 year old who presents for preventative health visit.     Patient has been advised of split billing requirements and indicates understanding: Yes  Healthy Habits:     Getting at least 3 servings of Calcium per day:  NO    Bi-annual eye exam:  NO    Dental care twice a year:  Yes    Sleep apnea or symptoms of sleep apnea:  None    Diet:  Regular (no restrictions)    Frequency of exercise:  2-3 days/week    Duration of exercise:  45-60 minutes    Taking medications regularly:  Yes    Medication side effects:  Not applicable    PHQ-2 Total Score: 0    Additional concerns today:  No    Depression Followup    How are you doing with your depression since your last visit? Stable , doing well    Are you having other symptoms that might be associated with depression? No    Have you had a significant life event?  No     Are you feeling anxious or having panic attacks?   No    Do you have any concerns with your use of alcohol or other drugs? No    Social History     Tobacco Use     Smoking status: Never     Smokeless tobacco: Never   Vaping Use     Vaping Use: Never used   Substance Use Topics     Alcohol use: Yes     Comment: infrequent social use     Drug use: No     PHQ 10/23/2020 6/23/2022 1/20/2023   PHQ-9 Total Score 0 0 1   Q9: Thoughts of better off dead/self-harm past 2 weeks Not at all Not at all Not at all     DALI-7 SCORE 4/24/2018 6/27/2018 10/23/2020   Total Score - - -   Total Score - 0 (minimal anxiety) -   Total Score 15 0 0         Today's PHQ-2 Score:   PHQ-2 ( 1999 [employer]) 1/20/2023   Q1: Little interest or pleasure in doing things 0   Q2: Feeling down, depressed or hopeless 0   PHQ-2 Score 0   PHQ-2 Total Score (12-17 Years)- Positive if 3 or more points; Administer PHQ-A if positive -   Q1: Little interest or pleasure in doing things Not at all   Q2: Feeling down, depressed or hopeless Not at all   PHQ-2 Score 0           Social History     Tobacco Use     Smoking status:  Never     Smokeless tobacco: Never   Substance Use Topics     Alcohol use: Yes     Comment: infrequent social use         Alcohol Use 2023   Prescreen: >3 drinks/day or >7 drinks/week? No   Prescreen: >3 drinks/day or >7 drinks/week? -       Last PSA: No results found for: PSA    Reviewed orders with patient. Reviewed health maintenance and updated orders accordingly - Yes  Lab work is in process  Labs reviewed in EPIC  BP Readings from Last 3 Encounters:   23 106/71   22 124/72   22 116/72    Wt Readings from Last 3 Encounters:   23 67.1 kg (148 lb)   22 65.2 kg (143 lb 12.8 oz)   22 66.7 kg (147 lb)                  Patient Active Problem List   Diagnosis     OCD (obsessive compulsive disorder)     Heart murmur     Infected insect bite of right leg     Impacted cerumen     Seasonal allergic conjunctivitis     Seasonal allergic rhinitis     Diagnostic skin and sensitization tests     FHx: cardiovascular disease     Common wart     Major depressive disorder, recurrent episode, mild (H)     Tinea versicolor     Scrotal cyst     Past Surgical History:   Procedure Laterality Date     EYE SURGERY      Lasik     [facility] PERC SKELETAL FIX UNSTABLE PHAL SHAFT FX W MANIP, EACH  10/19/2010    perc pinning Rt index prox phalanx fx     HERNIA REPAIR       [facility] STOMACH SURG[facility] PROCEDURE [facility]       [facility] RADI[facility] & ULNA         Social History     Tobacco Use     Smoking status: Never     Smokeless tobacco: Never   Substance Use Topics     Alcohol use: Yes     Comment: infrequent social use     Family History   Problem Relation Age of Onset     Coronary Artery Disease Father      Hypertension Father      Myocardial Infarction Father 47             Diabetes Paternal Grandfather      Cerebrovascular Disease Paternal Grandfather      Heart Disease Paternal Uncle 56     Macular Degeneration No family hx of      Glaucoma No family hx of      Thyroid  Disease No family hx of      Cancer No family hx of          Current Outpatient Medications   Medication Sig Dispense Refill     buPROPion (WELLBUTRIN XL) 300 MG 24 hr tablet Take 1 tablet by mouth once daily in the morning 90 tablet 3     cetirizine (ZYRTEC) 10 MG tablet Take 1 tablet (10 mg) by mouth every evening 30 tablet 11     citalopram (CELEXA) 20 MG tablet Take 1 tablet (20 mg) by mouth daily 90 tablet 3     methylPREDNISolone (MEDROL DOSEPAK) 4 MG tablet therapy pack Follow Package Directions 21 tablet 0     Misc Natural Products (GLUCOSAMINE CHONDROITIN ADV PO)        Allergies   Allergen Reactions     Nkda [No Known Drug Allergies]        Reviewed and updated as needed this visit by clinical staff   Tobacco  Allergies  Meds  Problems  Med Hx  Surg Hx  Fam Hx          Reviewed and updated as needed this visit by Provider   Tobacco  Allergies  Meds  Problems  Med Hx  Surg Hx  Fam Hx           Past Medical History:   Diagnosis Date     Depressive disorder      Diagnostic skin and sensitization tests 5/12/14 IgE tests pos. only for: tree pollens.      Heart murmur 2011    Heart murmur noted     Mild major depression (H) 5/1/2012     OCD (obsessive compulsive disorder)      Oral allergy syndrome     raw carrots, celery, apples, peach--NOT a true food allergy and NO Epipen needed.     Seasonal allergic conjunctivitis     5/12/14 IgE tests pos. only for: tree pollens.      Seasonal allergic rhinitis       Past Surgical History:   Procedure Laterality Date     EYE SURGERY  2014    Lasik     HC PERC SKELETAL FIX UNSTABLE PHAL SHAFT FX W MANIP, EACH  10/19/2010    perc pinning Rt index prox phalanx fx     HERNIA REPAIR  1996     [facility] STOMACH SURGERY PROCEDURE UNLISTED       [facility] OPEN TX RADIAL & ULNAR SHAFT FX FIX RADIUS & ULNA         Review of Systems   Constitutional: Negative for chills and fever.   HENT: Negative for congestion, ear pain, hearing loss and sore throat.    Eyes: Negative for pain  "and visual disturbance.   Respiratory: Negative for cough and shortness of breath.    Cardiovascular: Negative for chest pain, palpitations and peripheral edema.   Gastrointestinal: Negative for abdominal pain, constipation, diarrhea, heartburn, hematochezia and nausea.   Genitourinary: Negative for dysuria, frequency, genital sores, hematuria, impotence, penile discharge and urgency.   Musculoskeletal: Positive for arthralgias and myalgias. Negative for joint swelling.   Skin: Negative for rash.   Neurological: Negative for dizziness, weakness, headaches and paresthesias.   Psychiatric/Behavioral: Negative for mood changes. The patient is not nervous/anxious.      OBJECTIVE:   /71   Pulse 61   Temp 97.4  F (36.3  C) (Tympanic)   Resp 14   Ht 1.734 m (5' 8.25\")   Wt 67.1 kg (148 lb)   SpO2 98%   BMI 22.34 kg/m      Physical Exam  GENERAL: healthy, alert and no distress  EYES: Eyes grossly normal to inspection, PERRL and conjunctivae and sclerae normal  HENT: ear canals and TM's normal, nose and mouth without ulcers or lesions  NECK: no adenopathy, no asymmetry, masses, or scars and thyroid normal to palpation  RESP: lungs clear to auscultation - no rales, rhonchi or wheezes  CV: regular rate and rhythm, normal S1 S2, no S3 or S4, no murmur, click or rub, no peripheral edema and peripheral pulses strong  ABDOMEN: soft, nontender, no hepatosplenomegaly, no masses and bowel sounds normal   (male): normal male genitalia without lesions or urethral discharge, no hernia  MS: no gross musculoskeletal defects noted, no edema  SKIN: no suspicious lesions or rashes  NEURO: Normal strength and tone, mentation intact and speech normal  PSYCH: mentation appears normal, affect normal/bright    Diagnostic Test Results:  Labs reviewed in Epic    ASSESSMENT/PLAN:       ICD-10-CM    1. Encounter for preventive care  Z00.00       2. Obsessive-compulsive disorder, unspecified type  F42.9 buPROPion (WELLBUTRIN XL) 300 MG " 24 hr tablet     citalopram (CELEXA) 20 MG tablet      3. Major depressive disorder, recurrent episode, mild (H)  F33.0 buPROPion (WELLBUTRIN XL) 300 MG 24 hr tablet     citalopram (CELEXA) 20 MG tablet      1. Work on Healthy diet and exercise. Getting heart rate elevated for 30 mins most days of week.  2-3. medical conditions are stable. meds refilled. warning signs discussed.   Recheck yearly      COUNSELING:   Reviewed preventive health counseling, as reflected in patient instructions       Regular exercise       Healthy diet/nutrition        He reports that he has never smoked. He has never used smokeless tobacco.            Mik Eng PA-C  [facility] [place]  Answers for HPI/ROS submitted by the patient on 1/20/2023  If you checked off any problems, how difficult have these problems made it for you to do your work, take care of things at home, or get along with other people?: Not difficult at all  PHQ9 TOTAL SCORE: 1

## 2024-01-04 ENCOUNTER — PATIENT OUTREACH (OUTPATIENT)
Dept: CARE COORDINATION | Facility: CLINIC | Age: 29
End: 2024-01-04
Payer: COMMERCIAL

## 2024-01-18 ENCOUNTER — PATIENT OUTREACH (OUTPATIENT)
Dept: CARE COORDINATION | Facility: CLINIC | Age: 29
End: 2024-01-18
Payer: COMMERCIAL

## 2024-02-29 ENCOUNTER — OFFICE VISIT (OUTPATIENT)
Dept: FAMILY MEDICINE | Facility: CLINIC | Age: 29
End: 2024-02-29
Payer: COMMERCIAL

## 2024-02-29 VITALS
WEIGHT: 149 LBS | OXYGEN SATURATION: 99 % | DIASTOLIC BLOOD PRESSURE: 72 MMHG | HEIGHT: 68 IN | TEMPERATURE: 96 F | BODY MASS INDEX: 22.58 KG/M2 | HEART RATE: 52 BPM | RESPIRATION RATE: 14 BRPM | SYSTOLIC BLOOD PRESSURE: 110 MMHG

## 2024-02-29 DIAGNOSIS — Z00.00 ROUTINE GENERAL MEDICAL EXAMINATION AT A HEALTH CARE FACILITY: Primary | ICD-10-CM

## 2024-02-29 DIAGNOSIS — F42.9 OBSESSIVE-COMPULSIVE DISORDER, UNSPECIFIED TYPE: ICD-10-CM

## 2024-02-29 DIAGNOSIS — F33.0 MAJOR DEPRESSIVE DISORDER, RECURRENT EPISODE, MILD (H): ICD-10-CM

## 2024-02-29 LAB
ALBUMIN SERPL BCG-MCNC: 4.8 G/DL (ref 3.5–5.2)
ALP SERPL-CCNC: 68 U/L (ref 40–150)
ALT SERPL W P-5'-P-CCNC: 25 U/L (ref 0–70)
ANION GAP SERPL CALCULATED.3IONS-SCNC: 7 MMOL/L (ref 7–15)
AST SERPL W P-5'-P-CCNC: 34 U/L (ref 0–45)
BILIRUB SERPL-MCNC: 0.2 MG/DL
BUN SERPL-MCNC: 27.2 MG/DL (ref 6–20)
CALCIUM SERPL-MCNC: 9.8 MG/DL (ref 8.6–10)
CHLORIDE SERPL-SCNC: 103 MMOL/L (ref 98–107)
CHOLEST SERPL-MCNC: 136 MG/DL
CREAT SERPL-MCNC: 1.21 MG/DL (ref 0.67–1.17)
DEPRECATED HCO3 PLAS-SCNC: 29 MMOL/L (ref 22–29)
EGFRCR SERPLBLD CKD-EPI 2021: 83 ML/MIN/1.73M2
FASTING STATUS PATIENT QL REPORTED: YES
GLUCOSE SERPL-MCNC: 94 MG/DL (ref 70–99)
HDLC SERPL-MCNC: 47 MG/DL
LDLC SERPL CALC-MCNC: 80 MG/DL
NONHDLC SERPL-MCNC: 89 MG/DL
POTASSIUM SERPL-SCNC: 4.7 MMOL/L (ref 3.4–5.3)
PROT SERPL-MCNC: 7.5 G/DL (ref 6.4–8.3)
SODIUM SERPL-SCNC: 139 MMOL/L (ref 135–145)
TRIGL SERPL-MCNC: 43 MG/DL

## 2024-02-29 PROCEDURE — 99214 OFFICE O/P EST MOD 30 MIN: CPT | Mod: 25 | Performed by: PHYSICIAN ASSISTANT

## 2024-02-29 PROCEDURE — 96127 BRIEF EMOTIONAL/BEHAV ASSMT: CPT | Performed by: PHYSICIAN ASSISTANT

## 2024-02-29 PROCEDURE — 36415 COLL VENOUS BLD VENIPUNCTURE: CPT | Performed by: PHYSICIAN ASSISTANT

## 2024-02-29 PROCEDURE — 80061 LIPID PANEL: CPT | Performed by: PHYSICIAN ASSISTANT

## 2024-02-29 PROCEDURE — 99395 PREV VISIT EST AGE 18-39: CPT | Performed by: PHYSICIAN ASSISTANT

## 2024-02-29 PROCEDURE — 80053 COMPREHEN METABOLIC PANEL: CPT | Performed by: PHYSICIAN ASSISTANT

## 2024-02-29 RX ORDER — BUPROPION HYDROCHLORIDE 300 MG/1
TABLET ORAL
Qty: 90 TABLET | Refills: 3 | Status: SHIPPED | OUTPATIENT
Start: 2024-02-29

## 2024-02-29 RX ORDER — CITALOPRAM HYDROBROMIDE 20 MG/1
20 TABLET ORAL DAILY
Qty: 90 TABLET | Refills: 3 | Status: SHIPPED | OUTPATIENT
Start: 2024-02-29

## 2024-02-29 ASSESSMENT — PATIENT HEALTH QUESTIONNAIRE - PHQ9
10. IF YOU CHECKED OFF ANY PROBLEMS, HOW DIFFICULT HAVE THESE PROBLEMS MADE IT FOR YOU TO DO YOUR WORK, TAKE CARE OF THINGS AT HOME, OR GET ALONG WITH OTHER PEOPLE: NOT DIFFICULT AT ALL
SUM OF ALL RESPONSES TO PHQ QUESTIONS 1-9: 1

## 2024-02-29 NOTE — PROGRESS NOTES
Preventive Care Visit  [facility]  Mik Eng PA-C, Family Medicine  Feb 29, 2024    Assessment & Plan       ICD-10-CM    1. Routine general medical examination at a health care facility  Z00.00 Lipid panel reflex to direct LDL Fasting     Comprehensive metabolic panel (BMP + Alb, Alk Phos, ALT, AST, Total. Bili, TP)     Lipid panel reflex to direct LDL Fasting     Comprehensive metabolic panel (BMP + Alb, Alk Phos, ALT, AST, Total. Bili, TP)      2. Obsessive-compulsive disorder, unspecified type  F42.9 citalopram (CELEXA) 20 MG tablet     buPROPion (WELLBUTRIN XL) 300 MG 24 hr tablet      3. Major depressive disorder, recurrent episode, mild (H24)  F33.0 citalopram (CELEXA) 20 MG tablet     buPROPion (WELLBUTRIN XL) 300 MG 24 hr tablet      Work on Healthy diet and exercise. Getting heart rate elevated for 30 mins most days of week. Labs pending  -3. medical conditions are stable. meds refilled.     Recheck yearly.    Counseling  Appropriate preventive services were discussed with this patient, including applicable screening as appropriate for fall prevention, nutrition, physical activity, Tobacco-use cessation, weight loss and cognition.  Checklist reviewing preventive services available has been given to the patient.  Reviewed patient's diet, addressing concerns and/or questions.   He is at risk for lack of exercise and has been provided with information to increase physical activity for the benefit of his well-being.     Tiffanie Lang is a 28 year old, presenting for the following:  Physical        2/29/2024     9:36 AM   Additional Questions   Roomed by raman   Accompanied by self         2/29/2024     9:36 AM   Patient Reported Additional Medications   Patient reports taking the following new medications no        Health Care Directive  Patient does not have a Health Care Directive or Living Will: Discussed advance care planning with patient; information given to patient  to review.    HPI    Patient is on Wellbutrin and citalopram for OCD and depression and needs been tolerating medication well.  He has no concerns.  He has been on this for years.  Denies any thoughts hurting himself or others.        2/29/2024   General Health   How would you rate your overall physical health? Good   Feel stress (tense, anxious, or unable to sleep) Not at all         2/29/2024   Nutrition   Three or more servings of calcium each day? Yes   Diet: Regular (no restrictions)   How many servings of fruit and vegetables per day? (!) 2-3   How many sweetened beverages each day? 0-1         2/29/2024   Exercise   Days per week of moderate/strenous exercise 3 days         2/29/2024   Social Factors   Frequency of gathering with friends or relatives Once a week   Worry food won't last until get money to buy more No   Food not last or not have enough money for food? No   Do you have housing?  Yes   Are you worried about losing your housing? No   Lack of transportation? No   Unable to get utilities (heat,electricity)? No         2/29/2024   Dental   Dentist two times every year? Yes         2/29/2024   TB Screening   Were you born outside of [place]?  No       Today's PHQ-9 Score:       2/29/2024     9:33 AM   PHQ-9 SCORE   PHQ-9 Total Score MyChart 1 (Minimal depression)   PHQ-9 Total Score 1         2/29/2024   Substance Use   Alcohol more than 3/day or more than 7/wk Not Applicable   Do you use any other substances recreationally? No     Social History     Tobacco Use    Smoking status: Never    Smokeless tobacco: Never   Vaping Use    Vaping Use: Never used   Substance Use Topics    Alcohol use: Yes     Comment: infrequent social use    Drug use: No           2/29/2024   STI Screening   New sexual partner(s) since last STI/HIV test? No         2/29/2024   Contraception/Family Planning   Questions about contraception or family planning No        Reviewed and updated as needed this visit by Provider   Jose Alberto   Allergies  Meds  Problems  Med Hx  Surg Hx  Fam Hx            Past Medical History:   Diagnosis Date    Depressive disorder     Diagnostic skin and sensitization tests 5/12/14 IgE tests pos. only for: tree pollens.     Heart murmur 2011    Heart murmur noted    Mild major depression (H24) 5/1/2012    OCD (obsessive compulsive disorder)     Oral allergy syndrome     raw carrots, celery, apples, peach--NOT a true food allergy and NO Epipen needed.    Seasonal allergic conjunctivitis     5/12/14 IgE tests pos. only for: tree pollens.     Seasonal allergic rhinitis      Past Surgical History:   Procedure Laterality Date    EYE SURGERY  2014    Lasik    HC PERC SKELETAL FIX UNSTABLE PHAL SHAFT FX W MANIP, EACH  10/19/2010    perc pinning Rt index prox phalanx fx    HERNIA REPAIR  1996    [facility] STOMACH SURGERY PROCEDURE UNLISTED      [facility] OPEN TX RADIAL & ULNAR SHAFT FX FIX RADIUS & ULNA       Lab work is in process  Labs reviewed in EPIC  BP Readings from Last 3 Encounters:   02/29/24 110/72   01/20/23 106/71   08/01/22 124/72    Wt Readings from Last 3 Encounters:   02/29/24 67.6 kg (149 lb)   01/20/23 67.1 kg (148 lb)   08/01/22 65.2 kg (143 lb 12.8 oz)                  Patient Active Problem List   Diagnosis    OCD (obsessive compulsive disorder)    Heart murmur    Infected insect bite of right leg    Impacted cerumen    Seasonal allergic conjunctivitis    Seasonal allergic rhinitis    Diagnostic skin and sensitization tests    FHx: cardiovascular disease    Common wart    Major depressive disorder, recurrent episode, mild (H24)    Tinea versicolor    Scrotal cyst     Past Surgical History:   Procedure Laterality Date    EYE SURGERY  2014    Lasik    HC PERC SKELETAL FIX UNSTABLE PHAL SHAFT FX W MANIP, EACH  10/19/2010    perc pinning Rt index prox phalanx fx    HERNIA REPAIR  1996    [facility] STOMACH SURGERY PROCEDURE UNLISTED      [facility] OPEN TX RADIAL & ULNAR SHAFT FX FIX RADIUS & ULNA         Social History  "    Tobacco Use    Smoking status: Never    Smokeless tobacco: Never   Substance Use Topics    Alcohol use: Yes     Comment: infrequent social use     Family History   Problem Relation Age of Onset    Coronary Artery Disease Father     Hypertension Father     Myocardial Infarction Father 47            Diabetes Paternal Grandfather     Cerebrovascular Disease Paternal Grandfather     Heart Disease Paternal Uncle 56    Macular Degeneration No family hx of     Glaucoma No family hx of     Thyroid Disease No family hx of     Cancer No family hx of          Current Outpatient Medications   Medication Sig Dispense Refill    buPROPion (WELLBUTRIN XL) 300 MG 24 hr tablet Take 1 tablet by mouth once daily in the morning 90 tablet 3    cetirizine (ZYRTEC) 10 MG tablet Take 1 tablet (10 mg) by mouth every evening 30 tablet 11    citalopram (CELEXA) 20 MG tablet Take 1 tablet (20 mg) by mouth daily 90 tablet 3    methylPREDNISolone (MEDROL DOSEPAK) 4 MG tablet therapy pack Follow Package Directions 21 tablet 0    Misc Natural Products (GLUCOSAMINE CHONDROITIN ADV PO)        Allergies   Allergen Reactions    Nkda [No Known Drug Allergy]          Review of Systems  Constitutional, HEENT, cardiovascular, pulmonary, gi and gu systems are negative, except as otherwise noted.     Objective    Exam  /72   Pulse 52   Temp (!) 96  F (35.6  C) (Tympanic)   Resp 14   Ht 1.727 m (5' 8\")   Wt 67.6 kg (149 lb)   SpO2 99%   BMI 22.66 kg/m     Estimated body mass index is 22.66 kg/m  as calculated from the following:    Height as of this encounter: 1.727 m (5' 8\").    Weight as of this encounter: 67.6 kg (149 lb).    Physical Exam  GENERAL: alert and no distress  EYES: Eyes grossly normal to inspection, PERRL and conjunctivae and sclerae normal  HENT: ear canals and TM's normal, nose and mouth without ulcers or lesions  NECK: no adenopathy, no asymmetry, masses, or scars  RESP: lungs clear to auscultation - no rales, rhonchi " or wheezes  CV: regular rate and rhythm, normal S1 S2, no S3 or S4, no murmur, click or rub, no peripheral edema  ABDOMEN: soft, nontender, no hepatosplenomegaly, no masses and bowel sounds normal   (male): normal male genitalia without lesions or urethral discharge, no hernia  MS: no gross musculoskeletal defects noted, no edema  SKIN: no suspicious lesions or rashes  NEURO: Normal strength and tone, mentation intact and speech normal  PSYCH: mentation appears normal, affect normal/bright      Signed Electronically by: Mik Eng PA-C    Answers submitted by the patient for this visit:  Patient Health Questionnaire (Submitted on 2/29/2024)  If you checked off any problems, how difficult have these problems made it for you to do your work, take care of things at home, or get along with other people?: Not difficult at all  PHQ9 TOTAL SCORE: 1

## 2024-02-29 NOTE — PATIENT INSTRUCTIONS
Preventive Care Advice   This is general advice given by our system to help you stay healthy. However, your care team may have specific advice just for you. Please talk to your care team about your preventive care needs.  Nutrition  Eat 5 or more servings of fruits and vegetables each day.  Try wheat bread, brown rice and whole grain pasta (instead of white bread, rice, and pasta).  Get enough calcium and vitamin D. Check the label on foods and aim for 100% of the RDA (recommended daily allowance).  Lifestyle  Exercise at least 150 minutes each week   (30 minutes a day, 5 days a week).  Do muscle strengthening activities 2 days a week. These help control your weight and prevent disease.  No smoking.  Wear sunscreen to prevent skin cancer.  Have a dental exam and cleaning every 6 months.  Yearly exams  See your health care team every year to talk about:  Any changes in your health.  Any medicines your care team has prescribed.  Preventive care, family planning, and ways to prevent chronic diseases.  Shots (vaccines)   HPV shots (up to age 26), if you've never had them before.  Hepatitis B shots (up to age 59), if you've never had them before.  COVID-19 shot: Get this shot when it's due.  Flu shot: Get a flu shot every year.  Tetanus shot: Get a tetanus shot every 10 years.  Pneumococcal, hepatitis A, and RSV shots: Ask your care team if you need these based on your risk.  Shingles shot (for age 50 and up).  General health tests  Diabetes screening:  Starting at age 35, Get screened for diabetes at least every 3 years.  If you are younger than age 35, ask your care team if you should be screened for diabetes.  Cholesterol test: At age 39, start having a cholesterol test every 5 years, or more often if advised.  Bone density scan (DEXA): At age 50, ask your care team if you should have this scan for osteoporosis (brittle bones).  Hepatitis C: Get tested at least once in your life.  STIs (sexually transmitted  infections)  Before age 24: Ask your care team if you should be screened for STIs.  After age 24: Get screened for STIs if you're at risk. You are at risk for STIs (including HIV) if:  You are sexually active with more than one person.  You don't use condoms every time.  You or a partner was diagnosed with a sexually transmitted infection.  If you are at risk for HIV, ask about PrEP medicine to prevent HIV.  Get tested for HIV at least once in your life, whether you are at risk for HIV or not.  Cancer screening tests  Cervical cancer screening: If you have a cervix, begin getting regular cervical cancer screening tests at age 21. Most people who have regular screenings with normal results can stop after age 65. Talk about this with your provider.  Breast cancer scan (mammogram): If you've ever had breasts, begin having regular mammograms starting at age 40. This is a scan to check for breast cancer.  Colon cancer screening: It is important to start screening for colon cancer at age 45.  Have a colonoscopy test every 10 years (or more often if you're at risk) Or, ask your provider about stool tests like a FIT test every year or Cologuard test every 3 years.  To learn more about your testing options, visit: https://www.[facility] Information/352229.pdf.  For help making a decision, visit: https://bit.ly/ga22930.  Prostate cancer screening test: If you have a prostate and are age 55 to 69, ask your provider if you would benefit from a yearly prostate cancer screening test.  Lung cancer screening: If you are a current or former smoker age 50 to 80, ask your care team if ongoing lung cancer screenings are right for you.  For informational purposes only. Not to replace the advice of your health care provider. Copyright   2023 [employer]. All rights reserved. Clinically reviewed by the [facility]. [employer] [id number] - REV 01/24.

## 2025-03-17 ENCOUNTER — MYC REFILL (OUTPATIENT)
Dept: FAMILY MEDICINE | Facility: CLINIC | Age: 30
End: 2025-03-17
Payer: COMMERCIAL

## 2025-03-17 DIAGNOSIS — F33.0 MAJOR DEPRESSIVE DISORDER, RECURRENT EPISODE, MILD: ICD-10-CM

## 2025-03-17 DIAGNOSIS — F42.9 OBSESSIVE-COMPULSIVE DISORDER, UNSPECIFIED TYPE: ICD-10-CM

## 2025-03-18 RX ORDER — BUPROPION HYDROCHLORIDE 300 MG/1
TABLET ORAL
Qty: 90 TABLET | Refills: 0 | Status: SHIPPED | OUTPATIENT
Start: 2025-03-18

## 2025-03-18 RX ORDER — CITALOPRAM HYDROBROMIDE 20 MG/1
20 TABLET ORAL DAILY
Qty: 90 TABLET | Refills: 0 | Status: SHIPPED | OUTPATIENT
Start: 2025-03-18

## 2025-04-09 ENCOUNTER — OFFICE VISIT (OUTPATIENT)
Dept: FAMILY MEDICINE | Facility: CLINIC | Age: 30
End: 2025-04-09
Payer: COMMERCIAL

## 2025-04-09 VITALS
SYSTOLIC BLOOD PRESSURE: 115 MMHG | TEMPERATURE: 96.1 F | HEIGHT: 69 IN | RESPIRATION RATE: 16 BRPM | OXYGEN SATURATION: 99 % | WEIGHT: 151 LBS | BODY MASS INDEX: 22.36 KG/M2 | HEART RATE: 62 BPM | DIASTOLIC BLOOD PRESSURE: 75 MMHG

## 2025-04-09 DIAGNOSIS — F42.9 OBSESSIVE-COMPULSIVE DISORDER, UNSPECIFIED TYPE: ICD-10-CM

## 2025-04-09 DIAGNOSIS — F33.0 MAJOR DEPRESSIVE DISORDER, RECURRENT EPISODE, MILD: ICD-10-CM

## 2025-04-09 DIAGNOSIS — Z30.09 VASECTOMY EVALUATION: ICD-10-CM

## 2025-04-09 DIAGNOSIS — Z00.00 ROUTINE GENERAL MEDICAL EXAMINATION AT A HEALTH CARE FACILITY: Primary | ICD-10-CM

## 2025-04-09 RX ORDER — CITALOPRAM HYDROBROMIDE 20 MG/1
20 TABLET ORAL DAILY
Qty: 90 TABLET | Refills: 3 | Status: SHIPPED | OUTPATIENT
Start: 2025-04-09

## 2025-04-09 RX ORDER — BUPROPION HYDROCHLORIDE 300 MG/1
TABLET ORAL
Qty: 90 TABLET | Refills: 3 | Status: SHIPPED | OUTPATIENT
Start: 2025-04-09

## 2025-04-09 SDOH — HEALTH STABILITY: PHYSICAL HEALTH: ON AVERAGE, HOW MANY MINUTES DO YOU ENGAGE IN EXERCISE AT THIS LEVEL?: 60 MIN

## 2025-04-09 SDOH — HEALTH STABILITY: PHYSICAL HEALTH: ON AVERAGE, HOW MANY DAYS PER WEEK DO YOU ENGAGE IN MODERATE TO STRENUOUS EXERCISE (LIKE A BRISK WALK)?: 3 DAYS

## 2025-04-09 ASSESSMENT — SOCIAL DETERMINANTS OF HEALTH (SDOH): HOW OFTEN DO YOU GET TOGETHER WITH FRIENDS OR RELATIVES?: ONCE A WEEK

## 2025-04-09 ASSESSMENT — PATIENT HEALTH QUESTIONNAIRE - PHQ9
10. IF YOU CHECKED OFF ANY PROBLEMS, HOW DIFFICULT HAVE THESE PROBLEMS MADE IT FOR YOU TO DO YOUR WORK, TAKE CARE OF THINGS AT HOME, OR GET ALONG WITH OTHER PEOPLE: NOT DIFFICULT AT ALL
SUM OF ALL RESPONSES TO PHQ QUESTIONS 1-9: 2
SUM OF ALL RESPONSES TO PHQ QUESTIONS 1-9: 2

## 2025-04-09 ASSESSMENT — PAIN SCALES - GENERAL: PAINLEVEL_OUTOF10: NO PAIN (0)

## 2025-04-09 NOTE — PROGRESS NOTES
Preventive Care Visit  [facility]  Mik Eng PA-C, Family Medicine  Apr 9, 2025        Assessment & Plan       ICD-10-CM    1. Routine general medical examination at a health care facility  Z00.00       2. Obsessive-compulsive disorder, unspecified type  F42.9 buPROPion (WELLBUTRIN XL) 300 MG 24 hr tablet     citalopram (CELEXA) 20 MG tablet     OFFICE/OUTPT VISIT,EST,LEVL III      3. Major depressive disorder, recurrent episode, mild  F33.0 buPROPion (WELLBUTRIN XL) 300 MG 24 hr tablet     citalopram (CELEXA) 20 MG tablet     OFFICE/OUTPT VISIT,EST,LEVL III      4. Vasectomy evaluation  Z30.09 OFFICE/OUTPT VISIT,EST,LEVL III      Work on Healthy diet and exercise. Getting heart rate elevated for 30 mins most days of week.  2-3. medical conditions are stable. meds refilled. Recheck yearly or sooner if needed.   4. See below.       Counseling  Appropriate preventive services were addressed with this patient via screening, questionnaire, or discussion as appropriate for fall prevention, nutrition, physical activity, Tobacco-use cessation, social engagement, weight loss and cognition.  Checklist reviewing preventive services available has been given to the patient.  Reviewed patient's diet, addressing concerns and/or questions.   He is at risk for lack of exercise and has been provided with information to increase physical activity for the benefit of his well-being.   The longitudinal plan of care for the diagnosis(es)/condition(s) as documented were addressed during this visit. Due to the added complexity in care, I will continue to support Rickey in the subsequent management and with ongoing continuity of care.      Subjective   Rickey is a 29 year old, presenting for the following:  Physical        4/9/2025     9:33 AM   Additional Questions   Roomed by raman   Accompanied by self         4/9/2025     9:33 AM   Patient Reported Additional Medications   Patient reports taking the  following new medications no          HPI       History of depression and OCD which is doing well on medications. No concerns. No side effects.     Advance Care Planning  Patient does not have a Health Care Directive: Discussed advance care planning with patient; information given to patient to review.      4/9/2025   General Health   How would you rate your overall physical health? Good   Feel stress (tense, anxious, or unable to sleep) Only a little   (!) STRESS CONCERN      4/9/2025   Nutrition   Three or more servings of calcium each day? Yes   Diet: Regular (no restrictions)   How many servings of fruit and vegetables per day? (!) 2-3   How many sweetened beverages each day? 0-1         4/9/2025   Exercise   Days per week of moderate/strenous exercise 3 days   Average minutes spent exercising at this level 60 min         4/9/2025   Social Factors   Frequency of gathering with friends or relatives Once a week   Worry food won't last until get money to buy more No   Food not last or not have enough money for food? No   Do you have housing? (Housing is defined as stable permanent housing and does not include staying ouside in a car, in a tent, in an abandoned building, in an overnight shelter, or couch-surfing.) Yes   Are you worried about losing your housing? No   Lack of transportation? No   Unable to get utilities (heat,electricity)? No         4/9/2025   Dental   Dentist two times every year? Yes           2/29/2024   TB Screening   Were you born outside of the [place]? No         Today's PHQ-9 Score:       4/9/2025     5:03 AM   PHQ-9 SCORE   PHQ-9 Total Score MyChart 2 (Minimal depression)   PHQ-9 Total Score 2        Patient-reported         4/9/2025   Substance Use   Alcohol more than 3/day or more than 7/wk Not Applicable   Do you use any other substances recreationally? No     Social History     Tobacco Use    Smoking status: Never    Smokeless tobacco: Never   Vaping Use    Vaping status: Never Used    Substance Use Topics    Alcohol use: Yes     Comment: infrequent social use    Drug use: No           4/9/2025   STI Screening   New sexual partner(s) since last STI/HIV test? No         4/9/2025   Contraception/Family Planning   Questions about contraception or family planning (!) YES - interested in vasectomy. 2 kids.         Reviewed and updated as needed this visit by Provider   Tobacco  Allergies  Meds  Problems  Med Hx  Surg Hx  Fam Hx            Past Medical History:   Diagnosis Date    Depressive disorder     Diagnostic skin and sensitization tests 5/12/14 IgE tests pos. only for: tree pollens.     Heart murmur 2011    Heart murmur noted    Mild major depression 5/1/2012    OCD (obsessive compulsive disorder)     Oral allergy syndrome     raw carrots, celery, apples, peach--NOT a true food allergy and NO Epipen needed.    Seasonal allergic conjunctivitis     5/12/14 IgE tests pos. only for: tree pollens.     Seasonal allergic rhinitis      Past Surgical History:   Procedure Laterality Date    EYE SURGERY  2014    Lasik    [facility] PERC SKELETAL FIX UNSTABLE PHAL SHAFT FX W MANIP, EACH  10/19/2010    perc pinning Rt index prox phalanx fx    HERNIA REPAIR  1996    [facility] STOMACH [facility] PROCED[facility]LIST[facility]      [facility] ULNA       Lab work is in process  Labs reviewed in EPIC  BP Readings from Last 3 Encounters:   04/09/25 115/75   02/29/24 110/72   01/20/23 106/71    Wt Readings from Last 3 Encounters:   04/09/25 68.5 kg (151 lb)   02/29/24 67.6 kg (149 lb)   01/20/23 67.1 kg (148 lb)                  Patient Active Problem List   Diagnosis    OCD (obsessive compulsive disorder)    Heart murmur    Infected insect bite of right leg    Impacted cerumen    Seasonal allergic conjunctivitis    Seasonal allergic rhinitis    Diagnostic skin and sensitization tests    FHx: cardiovascular disease    Common wart    Major depressive disorder, recurrent episode, mild    Tinea  "versicolor    Scrotal cyst     Past Surgical History:   Procedure Laterality Date    EYE SURGERY      Lasik    [facility] PERC SKELETAL FIX UNSTABLE PHAL SHAFT FX W MANIP, EACH  10/19/2010    perc pinning Rt index prox phalanx fx    HERNIA REPAIR      [email or web address] STOMACH SURGERY PROCEDURE UNLISTED      [facility] OPEN TX RADIAL & ULNAR SHAFT FX FIX RADIUS & ULNA         Social History     Tobacco Use    Smoking status: Never    Smokeless tobacco: Never   Substance Use Topics    Alcohol use: Yes     Comment: infrequent social use     Family History   Problem Relation Age of Onset    Coronary Artery Disease Father     Hypertension Father     Myocardial Infarction Father 47            Diabetes Paternal Grandfather     Cerebrovascular Disease Paternal Grandfather     Heart Disease Paternal Uncle 56    Macular Degeneration No family hx of     Glaucoma No family hx of     Thyroid Disease No family hx of     Cancer No family hx of          Current Outpatient Medications   Medication Sig Dispense Refill    buPROPion (WELLBUTRIN XL) 300 MG 24 hr tablet Take 1 tablet by mouth once daily in the morning 90 tablet 3    cetirizine (ZYRTEC) 10 MG tablet Take 1 tablet (10 mg) by mouth every evening 30 tablet 11    citalopram (CELEXA) 20 MG tablet Take 1 tablet (20 mg) by mouth daily. 90 tablet 3    Misc Natural Products (GLUCOSAMINE CHONDROITIN ADV PO)        Allergies   Allergen Reactions    Nkda [No Known Drug Allergy]          Review of Systems  Constitutional, HEENT, cardiovascular, pulmonary, gi and gu systems are negative, except as otherwise noted.     Objective    Exam  /75   Pulse 62   Temp (!) 96.1  F (35.6  C) (Tympanic)   Resp 16   Ht 1.759 m (5' 9.25\")   Wt 68.5 kg (151 lb)   SpO2 99%   BMI 22.14 kg/m     Estimated body mass index is 22.14 kg/m  as calculated from the following:    Height as of this encounter: 1.759 m (5' 9.25\").    Weight as of this encounter: 68.5 kg (151 lb).    Physical Exam  GENERAL: alert and " no distress  EYES: Eyes grossly normal to inspection, PERRL and conjunctivae and sclerae normal  HENT: ear canals and TM's normal, nose and mouth without ulcers or lesions  NECK: no adenopathy, no asymmetry, masses, or scars  RESP: lungs clear to auscultation - no rales, rhonchi or wheezes  CV: regular rate and rhythm, normal S1 S2, no S3 or S4, no murmur, click or rub, no peripheral edema  ABDOMEN: soft, nontender, no hepatosplenomegaly, no masses and bowel sounds normal   (male): normal male genitalia without lesions or urethral discharge, no hernia  MS: no gross musculoskeletal defects noted, no edema  SKIN: no suspicious lesions or rashes  NEURO: Normal strength and tone, mentation intact and speech normal  PSYCH: mentation appears normal, affect normal/bright        Signed Electronically by: Mik Eng PA-C    Answers submitted by the patient for this visit:  Patient Health Questionnaire (Submitted on 4/9/2025)  If you checked off any problems, how difficult have these problems made it for you to do your work, take care of things at home, or get along with other people?: Not difficult at all  PHQ9 TOTAL SCORE: 2        SUBJECTIVE:  The patient presents for discussion of vasectomy.  The procedure was explained in detail using diagram from the vasectomy pamphlet published by [facility].  The permanency and effactiveness of this operation were explained in detail, including casectomy failure rate, bleeding, infection, scarring, chronic pain and epididymititis.  The patient was told to stay at bedrest for 48-72 hours, no heavy lifting for one week and to refrain from sex for 1 week.  The patient was also told to have a post operative sperm count at 3 months.  He should have another birth control until he has a sample that is infertile.      We next discussed the risks of vasectomy. The risks discussed included:    -Bleeding at the time of the procedure or later including hematoma  "development.  -Infection  -Postoperative discomfort  -Development of a sperm granuloma which is a lump that can form at the site of the transection of the vas deferens.  -Sperm buildup in the testicles that may cause a sensation of congestion or discomfort. which is usually responsive to a non steroidal anti-inflammatory drug.  -Epididymitis can also occur and can cause scrotal discomfort.  -Reconnection of the vas deferans which can occur in up to 1 in 2000 cases per the literature.  -Development of sperm antibodies which may leave a man infertile despite having a reversal of the vasectomy later.  - Long term testicular discomfort which is very rare.        OBJECTIVE:  On exam, this is a well-developed, well-nourished white male.  Weight is 151 lbs 0 oz.  Height is   Ht Readings from Last 2 Encounters:   04/09/25 1.759 m (5' 9.25\")   02/29/24 1.727 m (5' 8\")       Exam reveals a normal circumcised penis, no lesions.  Testes are descended bilaterally. Both vas deferens were easily identified by palpation.  No abnormalities were noted. Exam was limited to the genitalia    ASSESSMENT:  Vasectomy evaluation    PLAN:  He will schedule a vasectomy at his convenience. He was asked to wear warm layers of clothes on top and to wear warm socks.  He is aware that he will need to take several days off from work and any physical activity and essentially rest for two days with ice packs and ibuprofen for discomfort. He was also informed that he will need to bring a semen sample in 3 months to make sure that he has cleared the urinary tract  of any residual sperm and to make sure that he is sterile. He was informed that he should continue to use contraception until we have proven that he is sterile.    Mik Eng PA-C    "

## 2025-04-09 NOTE — PATIENT INSTRUCTIONS
Patient Education   Preventive Care Advice   This is general advice given by our system to help you stay healthy. However, your care team may have specific advice just for you. Please talk to your care team about your preventive care needs.  Nutrition  Eat 5 or more servings of fruits and vegetables each day.  Try wheat bread, brown rice and whole grain pasta (instead of white bread, rice, and pasta).  Get enough calcium and vitamin D. Check the label on foods and aim for 100% of the RDA (recommended daily allowance).  Lifestyle  Exercise at least 150 minutes each week  (30 minutes a day, 5 days a week).  Do muscle strengthening activities 2 days a week. These help control your weight and prevent disease.  No smoking.  Wear sunscreen to prevent skin cancer.  Have a dental exam and cleaning every 6 months.  Yearly exams  See your health care team every year to talk about:  Any changes in your health.  Any medicines your care team has prescribed.  Preventive care, family planning, and ways to prevent chronic diseases.  Shots (vaccines)   HPV shots (up to age 26), if you've never had them before.  Hepatitis B shots (up to age 59), if you've never had them before.  COVID-19 shot: Get this shot when it's due.  Flu shot: Get a flu shot every year.  Tetanus shot: Get a tetanus shot every 10 years.  Pneumococcal, hepatitis A, and RSV shots: Ask your care team if you need these based on your risk.  Shingles shot (for age 50 and up)  General health tests  Diabetes screening:  Starting at age 35, Get screened for diabetes at least every 3 years.  If you are younger than age 35, ask your care team if you should be screened for diabetes.  Cholesterol test: At age 39, start having a cholesterol test every 5 years, or more often if advised.  Bone density scan (DEXA): At age 50, ask your care team if you should have this scan for osteoporosis (brittle bones).  Hepatitis C: Get tested at least once in your life.  STIs (sexually  transmitted infections)  Before age 24: Ask your care team if you should be screened for STIs.  After age 24: Get screened for STIs if you're at risk. You are at risk for STIs (including HIV) if:  You are sexually active with more than one person.  You don't use condoms every time.  You or a partner was diagnosed with a sexually transmitted infection.  If you are at risk for HIV, ask about PrEP medicine to prevent HIV.  Get tested for HIV at least once in your life, whether you are at risk for HIV or not.  Cancer screening tests  Cervical cancer screening: If you have a cervix, begin getting regular cervical cancer screening tests starting at age 21.  Breast cancer scan (mammogram): If you've ever had breasts, begin having regular mammograms starting at age 40. This is a scan to check for breast cancer.  Colon cancer screening: It is important to start screening for colon cancer at age 45.  Have a colonoscopy test every 10 years (or more often if you're at risk) Or, ask your provider about stool tests like a FIT test every year or Cologuard test every 3 years.  To learn more about your testing options, visit:   .  For help making a decision, visit:   https://bit.ly/on39151.  Prostate cancer screening test: If you have a prostate, ask your care team if a prostate cancer screening test (PSA) at age 55 is right for you.  Lung cancer screening: If you are a current or former smoker ages 50 to 80, ask your care team if ongoing lung cancer screenings are right for you.  For informational purposes only. Not to replace the advice of your health care provider. Copyright   2023 [facility]. All rights reserved. Clinically reviewed by the [facility] Transitions Program. Luminate [id number] - REV 01/24.       Information for your Vasectomy.    Please eat something before your procedure.     Please arrive about 15 minutes before your scheduled time.     It is ok to take some tylenol 1-2 hours prior to your  procedure.     Please wear warm clothing.    Procedure takes about 1 hr but plan on being at the clinic about 1.5 hours.    Feel free to wear compression shorts/ or arthletic supporter for comfort.     Plan on very light activity for 1 wk with no lifting more that 20 lbs.     I recommend taking 2 days to recline with Icing 20 mins every couple yours.     Ok to use: non-steroidal anti-inflammatory medication like: Ibuprofen 600-800 mg three times daily or Aleve twice daily and/ or Tylenol 1-2 tabs po q6h as needed.    Abstain  from intercourse for about 1 wk so you can heal.    You can shower normally the next day. Just be gentle around that area.     You are not consider sterile until you leave a semen sample that doesn't contain sperm. (usually done about 3 months after vasectomy)    You will need to ejaculate at least 20 times between your surgery and the first sample.     Your may go home after procedure is complete.  There may be some pain in your groin for 3 to 4 days after the procedure.  You may have some blood or yellow liquid that loses from the incision sites.  He may have some swelling and bruising.  This is all normal.    Sutures will dissolve on their own and that can take a week or 2.    Watch for signs of infection which may include fever, pussy discharge, increased pain, swelling or redness.  Please contact us if this occurs.    Feel free to reach out to me with any questions that you have.     It is important that you rest for the first 48 hours and keep your activities minimal.        Thanks,  Mik Eng PA-C

## 2025-07-21 ENCOUNTER — MYC REFILL (OUTPATIENT)
Dept: FAMILY MEDICINE | Facility: CLINIC | Age: 30
End: 2025-07-21
Payer: COMMERCIAL

## 2025-07-21 DIAGNOSIS — F42.9 OBSESSIVE-COMPULSIVE DISORDER, UNSPECIFIED TYPE: ICD-10-CM

## 2025-07-21 DIAGNOSIS — F33.0 MAJOR DEPRESSIVE DISORDER, RECURRENT EPISODE, MILD: ICD-10-CM

## 2025-07-22 RX ORDER — BUPROPION HYDROCHLORIDE 300 MG/1
TABLET ORAL
Qty: 90 TABLET | Refills: 3 | OUTPATIENT
Start: 2025-07-22

## 2025-07-22 RX ORDER — CITALOPRAM HYDROBROMIDE 20 MG/1
20 TABLET ORAL DAILY
Qty: 90 TABLET | Refills: 3 | OUTPATIENT
Start: 2025-07-22